# Patient Record
Sex: MALE | Race: WHITE | Employment: FULL TIME | URBAN - METROPOLITAN AREA
[De-identification: names, ages, dates, MRNs, and addresses within clinical notes are randomized per-mention and may not be internally consistent; named-entity substitution may affect disease eponyms.]

---

## 2017-03-29 ENCOUNTER — TELEPHONE (OUTPATIENT)
Dept: INTERNAL MEDICINE CLINIC | Facility: CLINIC | Age: 37
End: 2017-03-29

## 2017-03-29 DIAGNOSIS — Z20.2 POSSIBLE EXPOSURE TO STD: Primary | ICD-10-CM

## 2017-03-29 NOTE — TELEPHONE ENCOUNTER
Patient asking if you could order STD testing he may go for, and follow up with Jayne Rojo when he returns next week. Had unprotected sex.

## 2017-03-30 NOTE — TELEPHONE ENCOUNTER
Message has been left on pt's voicemail letting him know that the lab tests have been ordered and he can go to one of [facility] labs and get it done.

## 2017-03-31 ENCOUNTER — LAB ENCOUNTER (OUTPATIENT)
Dept: LAB | Facility: HOSPITAL | Age: 37
End: 2017-03-31
Attending: INTERNAL MEDICINE
Payer: COMMERCIAL

## 2017-03-31 DIAGNOSIS — Z20.2 POSSIBLE EXPOSURE TO STD: ICD-10-CM

## 2017-03-31 PROCEDURE — 87591 N.GONORRHOEAE DNA AMP PROB: CPT

## 2017-03-31 PROCEDURE — 36415 COLL VENOUS BLD VENIPUNCTURE: CPT

## 2017-03-31 PROCEDURE — 87340 HEPATITIS B SURFACE AG IA: CPT

## 2017-03-31 PROCEDURE — 87491 CHLMYD TRACH DNA AMP PROBE: CPT

## 2017-03-31 PROCEDURE — 86780 TREPONEMA PALLIDUM: CPT

## 2017-03-31 PROCEDURE — 87389 HIV-1 AG W/HIV-1&-2 AB AG IA: CPT

## 2017-03-31 PROCEDURE — 86803 HEPATITIS C AB TEST: CPT

## 2017-03-31 PROCEDURE — 86694 HERPES SIMPLEX NES ANTBDY: CPT

## 2017-03-31 PROCEDURE — 86704 HEP B CORE ANTIBODY TOTAL: CPT

## 2017-03-31 PROCEDURE — 80500 HEPATITIS A B + C PROFILE: CPT

## 2017-03-31 PROCEDURE — 86708 HEPATITIS A ANTIBODY: CPT

## 2017-03-31 PROCEDURE — 86709 HEPATITIS A IGM ANTIBODY: CPT

## 2017-03-31 PROCEDURE — 86706 HEP B SURFACE ANTIBODY: CPT

## 2017-06-15 ENCOUNTER — PATIENT MESSAGE (OUTPATIENT)
Dept: INTERNAL MEDICINE CLINIC | Facility: CLINIC | Age: 37
End: 2017-06-15

## 2017-06-15 NOTE — TELEPHONE ENCOUNTER
Spoke with pt, wanted to schedule a follow up visit for a general check up. Last seen 12/13/16. Denies acute issues. Offered appt at 5:00 pm today, pt is unable to accept appt. Will be traveling for the next week and will call back upon his return.   Gita Nguyen

## 2017-06-15 NOTE — TELEPHONE ENCOUNTER
From: Omar Killian  To: Marvell Soulier, MD  Sent: 6/15/2017 11:39 AM CDT  Subject: Other    Dear Dr Usha Lyn,    I wanted to see if you had any appointments this afternoon around 3.30-4.30pm?    Kind regards,    Ishmael Casper

## 2017-06-26 ENCOUNTER — OFFICE VISIT (OUTPATIENT)
Dept: INTERNAL MEDICINE CLINIC | Facility: CLINIC | Age: 37
End: 2017-06-26

## 2017-06-26 ENCOUNTER — TELEPHONE (OUTPATIENT)
Dept: INTERNAL MEDICINE CLINIC | Facility: CLINIC | Age: 37
End: 2017-06-26

## 2017-06-26 VITALS
HEIGHT: 73 IN | HEART RATE: 66 BPM | BODY MASS INDEX: 29.55 KG/M2 | SYSTOLIC BLOOD PRESSURE: 133 MMHG | TEMPERATURE: 98 F | DIASTOLIC BLOOD PRESSURE: 87 MMHG | WEIGHT: 223 LBS

## 2017-06-26 DIAGNOSIS — F32.A DEPRESSION, UNSPECIFIED DEPRESSION TYPE: ICD-10-CM

## 2017-06-26 DIAGNOSIS — Z20.2 POSSIBLE EXPOSURE TO STD: Primary | ICD-10-CM

## 2017-06-26 PROCEDURE — 99214 OFFICE O/P EST MOD 30 MIN: CPT | Performed by: INTERNAL MEDICINE

## 2017-06-26 PROCEDURE — 99212 OFFICE O/P EST SF 10 MIN: CPT | Performed by: INTERNAL MEDICINE

## 2017-06-26 PROCEDURE — 36415 COLL VENOUS BLD VENIPUNCTURE: CPT | Performed by: INTERNAL MEDICINE

## 2017-06-26 RX ORDER — PAROXETINE HYDROCHLORIDE 20 MG/1
20 TABLET, FILM COATED ORAL DAILY
Qty: 30 TABLET | Refills: 2 | Status: SHIPPED | OUTPATIENT
Start: 2017-06-26 | End: 2017-10-24

## 2017-06-26 NOTE — TELEPHONE ENCOUNTER
Patient called asking if he could have orders placed for full STD panel. Per patient had unprotected sex and wants to be checked. He will schedule an appointment after labs done since he has more questions.

## 2017-06-26 NOTE — TELEPHONE ENCOUNTER
Per pcp, patient needs to come in and discuss tests requested.  Patient aware, appointment scheduled for today 6/26/17 at 6:45 pm.

## 2017-06-27 NOTE — PROGRESS NOTES
HPI:    Patient ID: Meenu Salazar is a 31year old male. HPI  Patient comes in today with complaint of feeling depressed lately last few months he said has been feeling down low energy feeling like not enjoying things he used to enjoy.   Patient is ma PHYSICAL EXAM:    Physical Exam   Constitutional: He is oriented to person, place, and time. He appears well-developed and well-nourished. HENT:   Head: Normocephalic and atraumatic.    Eyes: Conjunctivae and EOM are normal. Pupils are equal, round, a

## 2017-06-27 NOTE — PATIENT INSTRUCTIONS
ASSESSMENT/PLAN:   Possible exposure to std  (primary encounter diagnosis)- will test, need to use protection at all times  Depression, unspecified depression type- no suicidal ideation, will start on Paroxetine, take the medication as prescribed, if sympt

## 2017-07-17 ENCOUNTER — OFFICE VISIT (OUTPATIENT)
Dept: SURGERY | Facility: CLINIC | Age: 37
End: 2017-07-17

## 2017-07-17 VITALS
BODY MASS INDEX: 29.16 KG/M2 | SYSTOLIC BLOOD PRESSURE: 118 MMHG | HEIGHT: 73 IN | WEIGHT: 220 LBS | DIASTOLIC BLOOD PRESSURE: 82 MMHG | TEMPERATURE: 99 F | HEART RATE: 65 BPM | RESPIRATION RATE: 16 BRPM

## 2017-07-17 DIAGNOSIS — Z30.09 VASECTOMY EVALUATION: Primary | ICD-10-CM

## 2017-07-17 DIAGNOSIS — R35.1 NOCTURIA: ICD-10-CM

## 2017-07-17 DIAGNOSIS — R10.2 PERINEAL PAIN IN MALE: ICD-10-CM

## 2017-07-17 PROBLEM — G89.29 CHRONIC MALE PELVIC PAIN: Status: ACTIVE | Noted: 2017-07-17

## 2017-07-17 PROCEDURE — 99244 OFF/OP CNSLTJ NEW/EST MOD 40: CPT | Performed by: UROLOGY

## 2017-07-17 PROCEDURE — 99212 OFFICE O/P EST SF 10 MIN: CPT | Performed by: UROLOGY

## 2017-07-17 RX ORDER — DIAZEPAM 10 MG/1
TABLET ORAL
Qty: 1 TABLET | Refills: 0 | Status: SHIPPED | OUTPATIENT
Start: 2017-07-17

## 2017-07-17 RX ORDER — HYDROCODONE BITARTRATE AND ACETAMINOPHEN 5; 325 MG/1; MG/1
TABLET ORAL
Qty: 20 TABLET | Refills: 0 | Status: SHIPPED | OUTPATIENT
Start: 2017-07-17

## 2017-07-17 RX ORDER — GABAPENTIN 100 MG/1
CAPSULE ORAL
COMMUNITY
Start: 2017-02-28 | End: 2017-07-17

## 2017-07-17 NOTE — PROGRESS NOTES
Vincenzo Thomason is a 31year old male. HPI:   Patient presents with:  Vasectomy      History provided by PT. Reffered by Dr. Lacy Camilo. 1. [facility]                 Patient is 31years old  and his wife is  36years old. that he was prescribed an antibiotic for a prostate infection by a non [facility] physician. about a year ago.       Review of previous records:   6/26/2017: Office Visit: Dr. Jacob Curtis depressed lately last few months, marriage strained lately; Dep difficulty postponing urination, urinary frequency less then 2 hours, intermittent stream, weak stream, and nocturia 1x.   Gastrointestinal:  Negative for abdominal pain, constipation, decreased appetite, diarrhea and vomiting;      Neurological:  Negative EPIDIDYMIS  normal     LENGTH OF VAS DEFERENS  Right vas deferens located separate and lateral to spermatochord; average to slightly longer  DIAMETER OF VAS DEFERENS  average  ADIPOSE TISSUE UPPER SCROTUM none  URETHRAL MEATUS normal  PENIS circumcised could make his wife pregnant and has to bring in two separate semen specimens six weeks apart before we confirm that there are no longer any motile sperm in his distal reproductive tract.   I explained to him and he understands, that there is a possibility can occur lasting for years.   I have explained to patient  possible complications including possible bleeding, clot, infection,  nerve, anesthesia complications, persistent post-operative pain, possible injury to organs, possible failure of surgery or of s Prostate cancer screening/PSA screening  --   I strongly recommend to patient that he start annual prostate cancer PSA screening at age 56, unless if there is a family history of prostate cancer at a younger age, upon which recommendation might be to start procedure    7. Please do not drink any caffeinated beverages on the morning of the procedure; you may have them after the procedure    8.   Please eat breakfast or lunch before the procedure because we will not be putting you to sleep, because otherwise t urinate. 4. Please significantly restrict fluids for 3 hours before bedtime; drink as little liquids as possible to try to improve waking up at night to urinate.     5. Please limit alcohol within 4 hours of bedtime as it can contribute to waking up at Bobbi Schuler MD, 7/17/2017, 3:37 PM

## 2017-07-17 NOTE — PATIENT INSTRUCTIONS
1.  Please continue  birth control precautions without exception, every time you have intercourse,  until further notice    2. Proceed with plans for voluntary sterilization -- bilateral vasectomy     3.   NO aspirin or NSAIDs (medications such as Advil, M hydrocodone 30 minutes before the actual start time of the procedure and not necessarily when you arrive at the surgery center.           13.  Please hold omega-3 pills, garlic pills, glucosamine–chondroitin for 7 days before procedure                  If w

## 2017-10-24 ENCOUNTER — MED REC SCAN ONLY (OUTPATIENT)
Dept: INTERNAL MEDICINE CLINIC | Facility: CLINIC | Age: 37
End: 2017-10-24

## 2017-10-24 ENCOUNTER — OFFICE VISIT (OUTPATIENT)
Dept: INTERNAL MEDICINE CLINIC | Facility: CLINIC | Age: 37
End: 2017-10-24

## 2017-10-24 VITALS
RESPIRATION RATE: 18 BRPM | DIASTOLIC BLOOD PRESSURE: 84 MMHG | TEMPERATURE: 98 F | SYSTOLIC BLOOD PRESSURE: 125 MMHG | WEIGHT: 218 LBS | BODY MASS INDEX: 28.89 KG/M2 | HEART RATE: 62 BPM | HEIGHT: 73 IN

## 2017-10-24 DIAGNOSIS — Z00.00 ANNUAL PHYSICAL EXAM: Primary | ICD-10-CM

## 2017-10-24 DIAGNOSIS — Z11.3 SCREEN FOR STD (SEXUALLY TRANSMITTED DISEASE): ICD-10-CM

## 2017-10-24 DIAGNOSIS — H53.9 VISION CHANGES: ICD-10-CM

## 2017-10-24 DIAGNOSIS — D49.0 PANCREATIC NEOPLASM: ICD-10-CM

## 2017-10-24 PROCEDURE — 90686 IIV4 VACC NO PRSV 0.5 ML IM: CPT | Performed by: INTERNAL MEDICINE

## 2017-10-24 PROCEDURE — 99395 PREV VISIT EST AGE 18-39: CPT | Performed by: INTERNAL MEDICINE

## 2017-10-24 PROCEDURE — 36415 COLL VENOUS BLD VENIPUNCTURE: CPT | Performed by: INTERNAL MEDICINE

## 2017-10-24 PROCEDURE — 90471 IMMUNIZATION ADMIN: CPT | Performed by: INTERNAL MEDICINE

## 2017-10-24 NOTE — PROGRESS NOTES
HPI:    Patient ID: Beka Saldana is a 30year old male.     HPI  She comes in for annual checkup, overall he is doing okay like to be checked for STDs denies any other complaints last time I saw him he was complaining of depression took the medication f pertinent surgical history. No family history on file.    Social History: Smoking status: Never Smoker                                                              Alcohol use: Yes           3.0 oz/week     Cans of beer: 6 per week       PHYSICAL EXAM: Antigen      HIV AG AB Combo      T PALLIDUM SCREENING CASCADE      Flulaval 0.5 ml 6 mon and older Quad single dose PF (13951)      Chlamydia/Gc Amplification    Meds This Visit:  No prescriptions requested or ordered in this encounter    Imaging & Referr

## 2017-10-24 NOTE — PATIENT INSTRUCTIONS
ASSESSMENT/PLAN:   Annual physical exam  (primary encounter diagnosis) exam is okay we will add some labs will call you with results  Screen for std (sexually transmitted disease) will test for STDs which no results  Pancreatic neoplasm doing well has foll

## 2017-12-10 ENCOUNTER — PATIENT MESSAGE (OUTPATIENT)
Dept: INTERNAL MEDICINE CLINIC | Facility: CLINIC | Age: 37
End: 2017-12-10

## 2017-12-10 DIAGNOSIS — Z71.1 CONCERN ABOUT STD IN MALE WITHOUT DIAGNOSIS: Primary | ICD-10-CM

## 2017-12-11 ENCOUNTER — APPOINTMENT (OUTPATIENT)
Dept: LAB | Facility: HOSPITAL | Age: 37
End: 2017-12-11
Attending: INTERNAL MEDICINE
Payer: COMMERCIAL

## 2017-12-11 ENCOUNTER — TELEPHONE (OUTPATIENT)
Dept: INTERNAL MEDICINE CLINIC | Facility: CLINIC | Age: 37
End: 2017-12-11

## 2017-12-11 DIAGNOSIS — Z71.1 CONCERN ABOUT STD IN MALE WITHOUT DIAGNOSIS: ICD-10-CM

## 2017-12-11 DIAGNOSIS — R30.0 DYSURIA: Primary | ICD-10-CM

## 2017-12-11 PROCEDURE — 86780 TREPONEMA PALLIDUM: CPT

## 2017-12-11 PROCEDURE — 87389 HIV-1 AG W/HIV-1&-2 AB AG IA: CPT

## 2017-12-11 PROCEDURE — 87340 HEPATITIS B SURFACE AG IA: CPT

## 2017-12-11 PROCEDURE — 81001 URINALYSIS AUTO W/SCOPE: CPT | Performed by: INTERNAL MEDICINE

## 2017-12-11 PROCEDURE — 87086 URINE CULTURE/COLONY COUNT: CPT | Performed by: INTERNAL MEDICINE

## 2017-12-11 PROCEDURE — 87491 CHLMYD TRACH DNA AMP PROBE: CPT | Performed by: INTERNAL MEDICINE

## 2017-12-11 PROCEDURE — 36415 COLL VENOUS BLD VENIPUNCTURE: CPT

## 2017-12-11 PROCEDURE — 86803 HEPATITIS C AB TEST: CPT

## 2017-12-11 PROCEDURE — 87591 N.GONORRHOEAE DNA AMP PROB: CPT | Performed by: INTERNAL MEDICINE

## 2017-12-11 RX ORDER — CIPROFLOXACIN 250 MG/1
250 TABLET, FILM COATED ORAL 2 TIMES DAILY
Qty: 14 TABLET | Refills: 0 | Status: SHIPPED | OUTPATIENT
Start: 2017-12-11 | End: 2017-12-18

## 2017-12-11 NOTE — TELEPHONE ENCOUNTER
From: Shereen Maynard  To: Rosa Collado MD  Sent: 12/10/2017 9:35 PM CST  Subject: Visit Follow-up Question    Dear Dr Ryan Cote,    I would appreciate if you could give me a call as I have a follow up request from my last vist to your office.     Kind rega

## 2017-12-11 NOTE — TELEPHONE ENCOUNTER
Pt stated when urine still having burning senation,   Will like to do another urine sample. Asked to place order, will like to stop by hospital, at  [address], [address] for Health to have the procedure done.

## 2017-12-12 ENCOUNTER — TELEPHONE (OUTPATIENT)
Dept: INTERNAL MEDICINE CLINIC | Facility: CLINIC | Age: 37
End: 2017-12-12

## 2017-12-12 NOTE — TELEPHONE ENCOUNTER
Patient aware they could use urine collected yesterday for gc/chlamydia. Patient informed to wait for call back for test results. Patient verbalized understanding, denies further questions.

## 2017-12-12 NOTE — TELEPHONE ENCOUNTER
Pt is concerned about test results, pt was informed that not all results were back yet but he still wants to know results.

## 2017-12-13 NOTE — TELEPHONE ENCOUNTER
Spoke with patient, aware chlamydia/gono still pending but urine culture needs to be reviewed by pcp. Dr. Riana Diehl, urine culture states <10,000 CFU/ML Gram negative anatoliy.  Do you want him to continue taking cipro until STIs come in?

## 2017-12-13 NOTE — TELEPHONE ENCOUNTER
Patient informed to continue taking antibiotic. Issac Anthony still pending. Will call patient once results are in. Patient verbalized understanding.

## 2018-01-09 ENCOUNTER — TELEPHONE (OUTPATIENT)
Dept: INTERNAL MEDICINE CLINIC | Facility: CLINIC | Age: 38
End: 2018-01-09

## 2018-01-09 DIAGNOSIS — R10.13 EPIGASTRIC PAIN: Primary | ICD-10-CM

## 2018-01-09 DIAGNOSIS — Z87.19 HISTORY OF PANCREATITIS: ICD-10-CM

## 2018-01-09 NOTE — TELEPHONE ENCOUNTER
Patient called had episode of left side upper abdominal pain radiating to back. Concerned as it feels like similar pain when he had pancreatitis before his Whipple surgery. Patient in [facility]. Denies Nausea, vomiting or diarrhea. Had a little chills.   St

## 2018-01-14 ENCOUNTER — APPOINTMENT (OUTPATIENT)
Dept: LAB | Facility: HOSPITAL | Age: 38
End: 2018-01-14
Attending: INTERNAL MEDICINE
Payer: COMMERCIAL

## 2018-01-14 DIAGNOSIS — Z87.19 HISTORY OF PANCREATITIS: ICD-10-CM

## 2018-01-14 DIAGNOSIS — R10.13 EPIGASTRIC PAIN: ICD-10-CM

## 2018-01-14 LAB
ALBUMIN SERPL BCP-MCNC: 4.4 G/DL (ref 3.5–4.8)
ALBUMIN/GLOB SERPL: 1.8 {RATIO} (ref 1–2)
ALP SERPL-CCNC: 64 U/L (ref 32–100)
ALT SERPL-CCNC: 43 U/L (ref 17–63)
ANION GAP SERPL CALC-SCNC: 8 MMOL/L (ref 0–18)
AST SERPL-CCNC: 31 U/L (ref 15–41)
BILIRUB SERPL-MCNC: 2.6 MG/DL (ref 0.3–1.2)
BILIRUB UR QL: NEGATIVE
BUN SERPL-MCNC: 9 MG/DL (ref 8–20)
BUN/CREAT SERPL: 9.2 (ref 10–20)
CALCIUM SERPL-MCNC: 9.2 MG/DL (ref 8.5–10.5)
CHLORIDE SERPL-SCNC: 104 MMOL/L (ref 95–110)
CHOLEST SERPL-MCNC: 142 MG/DL (ref 110–200)
CLARITY UR: CLEAR
CO2 SERPL-SCNC: 27 MMOL/L (ref 22–32)
COLOR UR: YELLOW
CREAT SERPL-MCNC: 0.98 MG/DL (ref 0.5–1.5)
GLOBULIN PLAS-MCNC: 2.4 G/DL (ref 2.5–3.7)
GLUCOSE SERPL-MCNC: 96 MG/DL (ref 70–99)
GLUCOSE UR-MCNC: NEGATIVE MG/DL
HDLC SERPL-MCNC: 31 MG/DL
HGB UR QL STRIP.AUTO: NEGATIVE
KETONES UR-MCNC: NEGATIVE MG/DL
LDLC SERPL CALC-MCNC: 92 MG/DL (ref 0–99)
LEUKOCYTE ESTERASE UR QL STRIP.AUTO: NEGATIVE
LIPASE SERPL-CCNC: 23 U/L (ref 22–51)
NITRITE UR QL STRIP.AUTO: NEGATIVE
NONHDLC SERPL-MCNC: 111 MG/DL
OSMOLALITY UR CALC.SUM OF ELEC: 287 MOSM/KG (ref 275–295)
PH UR: 6 [PH] (ref 5–8)
POTASSIUM SERPL-SCNC: 3.1 MMOL/L (ref 3.3–5.1)
PROT SERPL-MCNC: 6.8 G/DL (ref 5.9–8.4)
PROT UR-MCNC: NEGATIVE MG/DL
SODIUM SERPL-SCNC: 139 MMOL/L (ref 136–144)
SP GR UR STRIP: 1.01 (ref 1–1.03)
TRIGL SERPL-MCNC: 93 MG/DL (ref 1–149)
UROBILINOGEN UR STRIP-ACNC: <2
VIT C UR-MCNC: NEGATIVE MG/DL

## 2018-01-14 PROCEDURE — 80053 COMPREHEN METABOLIC PANEL: CPT

## 2018-01-14 PROCEDURE — 36415 COLL VENOUS BLD VENIPUNCTURE: CPT

## 2018-01-14 PROCEDURE — 83690 ASSAY OF LIPASE: CPT

## 2018-01-14 PROCEDURE — 80061 LIPID PANEL: CPT

## 2018-01-14 PROCEDURE — 81003 URINALYSIS AUTO W/O SCOPE: CPT | Performed by: INTERNAL MEDICINE

## 2018-04-27 ENCOUNTER — OFFICE VISIT (OUTPATIENT)
Dept: FAMILY MEDICINE | Facility: CLINIC | Age: 38
End: 2018-04-27
Payer: COMMERCIAL

## 2018-04-27 VITALS
SYSTOLIC BLOOD PRESSURE: 104 MMHG | OXYGEN SATURATION: 99 % | BODY MASS INDEX: 30.68 KG/M2 | DIASTOLIC BLOOD PRESSURE: 76 MMHG | WEIGHT: 226.5 LBS | TEMPERATURE: 98.7 F | RESPIRATION RATE: 16 BRPM | HEART RATE: 78 BPM | HEIGHT: 72 IN

## 2018-04-27 DIAGNOSIS — R30.0 DYSURIA: ICD-10-CM

## 2018-04-27 DIAGNOSIS — N34.2 URETHRITIS: Primary | ICD-10-CM

## 2018-04-27 LAB
ALBUMIN UR-MCNC: NEGATIVE MG/DL
APPEARANCE UR: CLEAR
BILIRUB UR QL STRIP: NEGATIVE
COLOR UR AUTO: YELLOW
GLUCOSE UR STRIP-MCNC: NEGATIVE MG/DL
HGB UR QL STRIP: NEGATIVE
KETONES UR STRIP-MCNC: NEGATIVE MG/DL
LEUKOCYTE ESTERASE UR QL STRIP: NEGATIVE
NITRATE UR QL: NEGATIVE
PH UR STRIP: 6 PH (ref 5–7)
RBC #/AREA URNS AUTO: NORMAL /HPF
SOURCE: NORMAL
SP GR UR STRIP: 1.01 (ref 1–1.03)
UROBILINOGEN UR STRIP-ACNC: 0.2 EU/DL (ref 0.2–1)
WBC #/AREA URNS AUTO: NORMAL /HPF

## 2018-04-27 PROCEDURE — 99203 OFFICE O/P NEW LOW 30 MIN: CPT | Performed by: FAMILY MEDICINE

## 2018-04-27 PROCEDURE — 81001 URINALYSIS AUTO W/SCOPE: CPT | Performed by: FAMILY MEDICINE

## 2018-04-27 RX ORDER — CIPROFLOXACIN 500 MG/1
500 TABLET, FILM COATED ORAL 2 TIMES DAILY
Qty: 20 TABLET | Refills: 0 | Status: SHIPPED | OUTPATIENT
Start: 2018-04-27 | End: 2018-08-31

## 2018-04-27 ASSESSMENT — ANXIETY QUESTIONNAIRES
3. WORRYING TOO MUCH ABOUT DIFFERENT THINGS: SEVERAL DAYS
1. FEELING NERVOUS, ANXIOUS, OR ON EDGE: SEVERAL DAYS
2. NOT BEING ABLE TO STOP OR CONTROL WORRYING: SEVERAL DAYS
7. FEELING AFRAID AS IF SOMETHING AWFUL MIGHT HAPPEN: MORE THAN HALF THE DAYS
6. BECOMING EASILY ANNOYED OR IRRITABLE: MORE THAN HALF THE DAYS
IF YOU CHECKED OFF ANY PROBLEMS ON THIS QUESTIONNAIRE, HOW DIFFICULT HAVE THESE PROBLEMS MADE IT FOR YOU TO DO YOUR WORK, TAKE CARE OF THINGS AT HOME, OR GET ALONG WITH OTHER PEOPLE: SOMEWHAT DIFFICULT
5. BEING SO RESTLESS THAT IT IS HARD TO SIT STILL: MORE THAN HALF THE DAYS
GAD7 TOTAL SCORE: 11

## 2018-04-27 ASSESSMENT — PATIENT HEALTH QUESTIONNAIRE - PHQ9: 5. POOR APPETITE OR OVEREATING: MORE THAN HALF THE DAYS

## 2018-04-27 NOTE — MR AVS SNAPSHOT
"              After Visit Summary   2018    Kathleen Clark    MRN: [id number]           Patient Information     Date Of Birth          1980        Visit Information        Provider Department      2018 2:30 PM Chencho Morales MD [facility]        Today's Diagnoses     Urethritis    -  1    Dysuria          Care Instructions    Push fluids          Follow-ups after your visit        Who to contact     If you have questions or need follow up information about today's clinic visit or your schedule please contact [facility] directly at [phone].  Normal or non-critical lab and imaging results will be communicated to you by MyChart, letter or phone within 4 business days after the clinic has received the results. If you do not hear from us within 7 days, please contact the clinic through [employer] or phone. If you have a critical or abnormal lab result, we will notify you by phone as soon as possible.  Submit refill requests through [employer] or call your pharmacy and they will forward the refill request to us. Please allow 3 business days for your refill to be completed.          Additional Information About Your Visit        MyChart Information     [employer] lets you send messages to your doctor, view your test results, renew your prescriptions, schedule appointments and more. To sign up, go to [email or web address] . Click on \"Log in\" on the left side of the screen, which will take you to the Welcome page. Then click on \"Sign up Now\" on the right side of the page.     You will be asked to enter the access code listed below, as well as some personal information. Please follow the directions to create your username and password.     Your access code is: [email or web address]  Expires: 2018  2:22 PM     Your access code will  in 90 days. If you need help or a new code, please call your [facility] or [phone].        Care EveryWhere " ID     This is your Care EveryWhere ID. This could be used by other organizations to access your [facility] medical records  [id number]        Your Vitals Were     Pulse Temperature Respirations Height Pulse Oximetry BMI (Body Mass Index)    78 98.7  F (37.1  C) (Tympanic) 16 6' (1.829 m) 99% 30.72 kg/m2       Blood Pressure from Last 3 Encounters:   04/27/18 104/76    Weight from Last 3 Encounters:   04/27/18 226 lb 8 oz (102.7 kg)              We Performed the Following     UA with Microscopic reflex to Culture          Today's Medication Changes          These changes are accurate as of 4/27/18  3:20 PM.  If you have any questions, ask your nurse or doctor.               Start taking these medicines.        Dose/Directions    ciprofloxacin 500 MG tablet   Commonly known as:  CIPRO   Used for:  Urethritis   Started by:  Chencho Morales MD        Dose:  500 mg   Take 1 tablet (500 mg) by mouth 2 times daily   Quantity:  20 tablet   Refills:  0            Where to get your medicines      Some of these will need a paper prescription and others can be bought over the counter.  Ask your nurse if you have questions.     Bring a paper prescription for each of these medications     ciprofloxacin 500 MG tablet                Primary Care Provider Fax #    Physician No Ref-Primary [phone]       No address on file        Equal Access to Services     GAIL PALMER AH: Genaro pabono Soorlando, waaxda luqadaha, qaybta kaalmada adeegyada, rick wynn. So [facility] [phone].    ATENCIÓN: Si habla español, tiene a cruz disposición servicios gratuitos de asistencia lingüística. Llame al [phone].    We comply with applicable federal civil rights laws and [place] laws. We do not discriminate on the basis of race, color, national origin, age, disability, sex, sexual orientation, or gender identity.            Thank you!     Thank you for choosing [facility]  for  your care. Our goal is always to provide you with excellent care. Hearing back from our patients is one way we can continue to improve our services. Please take a few minutes to complete the written survey that you may receive in the mail after your visit with us. Thank you!             Your Updated Medication List - Protect others around you: Learn how to safely use, store and throw away your medicines at www.disposemymeds.org.          This list is accurate as of 4/27/18  3:20 PM.  Always use your most recent med list.                   Brand Name Dispense Instructions for use Diagnosis    ciprofloxacin 500 MG tablet    CIPRO    20 tablet    Take 1 tablet (500 mg) by mouth 2 times daily    Urethritis       FISH OIL PO

## 2018-04-27 NOTE — PROGRESS NOTES
SUBJECTIVE:   Kathleen Clark is a 37 year old male who presents to clinic today for the following health issues:    Emigrated from [place] 7 yrs ago    Recently moved from [place] to [place].     Genitourinary - Male  Onset: 1-2 weeks    Description:   Dysuria (painful urination): YES  Hematuria (blood in urine): no   Frequency: YES  Are you urinating at night : YES  Hesitancy (delay in urine): no   Retention (unable to empty): no   Decrease in urinary flow: no   Incontinence: no     Progression of Symptoms:  worsening    Accompanying Signs & Symptoms:  Fever: no   Back/Flank pain: no   Urethral discharge: no   Testicle lumps/masses/pain: no   Nausea and/or vomiting: no   Abdominal pain: no     History:   History of frequent UTI's: no. Had one in 12/2017  History of kidney stones: no   History of hernias: YES  Personal or Family history of Prostate problems: YES- PGF  Sexually active: YES    Precipitating factors:   Worse when drinking caffeneited drinks    Alleviating factors:  None    Hx of previous UTI in Dec 2017.  Pt was seen by urologist in [place]       Problem list and histories reviewed & adjusted, as indicated.  Additional history: as documented    Labs reviewed in EPIC    Reviewed and updated as needed this visit by clinical staff       Reviewed and updated as needed this visit by Provider         ROS:  CONSTITUTIONAL:NEGATIVE for fever, chills, change in weight  : positive for, dysuria, frequency and nocturia x 1    OBJECTIVE:                                                    /76 (BP Location: Right arm, Patient Position: Sitting, Cuff Size: Adult Large)  Pulse 78  Temp 98.7  F (37.1  C) (Tympanic)  Resp 16  Ht 6' (1.829 m)  Wt 226 lb 8 oz (102.7 kg)  SpO2 99%  BMI 30.72 kg/m2  Body mass index is 30.72 kg/(m^2).  GENERAL APPEARANCE: healthy, alert and no distress  ABDOMEN: soft, nontender, without hepatosplenomegaly or masses, bowel sounds normal and   RECTAL: prostate normal, non  tender   (male): testicles normal without atrophy or masses, no hernias and penis normal without urethral discharge    Diagnostic test results:  Urinalysis - unremarkable     ASSESSMENT/PLAN:                                                        ICD-10-CM    1. Urethritis N34.2 ciprofloxacin (CIPRO) 500 MG tablet   2. Dysuria R30.0 UA with Microscopic reflex to Culture       Follow up with Provider - as needed if not improving   There are no Patient Instructions on file for this visit.    Chencho Morales MD  [facility]

## 2018-04-27 NOTE — NURSING NOTE
Chief Complaint   Patient presents with     Urinary Pain     1-2 weeks       Initial /76 (BP Location: Right arm, Patient Position: Sitting, Cuff Size: Adult Large)  Pulse 78  Temp 98.7  F (37.1  C) (Tympanic)  Resp 16  Ht 6' (1.829 m)  Wt 226 lb 8 oz (102.7 kg)  SpO2 99%  BMI 30.72 kg/m2 Estimated body mass index is 30.72 kg/(m^2) as calculated from the following:    Height as of this encounter: 6' (1.829 m).    Weight as of this encounter: 226 lb 8 oz (102.7 kg).  Medication Reconciliation: complete     Princess YANICK Meyer, CMA

## 2018-04-28 ASSESSMENT — PATIENT HEALTH QUESTIONNAIRE - PHQ9: SUM OF ALL RESPONSES TO PHQ QUESTIONS 1-9: 11

## 2018-04-28 ASSESSMENT — ANXIETY QUESTIONNAIRES: GAD7 TOTAL SCORE: 11

## 2018-08-30 ENCOUNTER — TELEPHONE (OUTPATIENT)
Dept: FAMILY MEDICINE | Facility: CLINIC | Age: 38
End: 2018-08-30

## 2018-08-30 NOTE — TELEPHONE ENCOUNTER
Patient called reporting abdominal pain  ABDOMINAL PAIN     Onset: Sunday    Description:   Character: not in pain right now  Location:below rib cage region  Radiation: None    Intensity: states not in pain, states it is like a pulsing, started taking nexium    Progression of Symptoms:  improving    Accompanying Signs & Symptoms:  Fever/Chills?: no   Gas/Bloating: no   Nausea: no   Vomitting: no   Diarrhea?: no   Constipation:no   Dysuria or Hematuria: no    History:   Trauma: no   Previous similar pain: YES, pancreatitis, had wipple procedure 10/15, removed part of the pancreas, (head of the pancreatitis, had cyst in the main duct of the pancreas, benign)   Previous tests done: surgery    Precipitating factors:   Does the pain change with:     Food: YES, caffeine beverages (coffee)    BM: no     Urination: no     Alleviating factors:  Nexium    Therapies Tried and outcome: nexium brought improvement.         LMP:  not applicable        Recent Medication changes: started nexium, pain is gone, but pulsing or discomfort in the area around his rib cage still remains.     Appointment scheduled for early tomorrow morning, patient is leaving for [place] Monday, will be flying in from [facility] and wants to know if Dr. Morales would like any labs drawn before hand. The patient is worried that with his Hx, this may be pancreatitis again, he wants to make sure.     He would be arriving in [facility] at 7:30 pm and could go [place] (possibly, triage will check on this, if the PCP wants to order labs ahead of time) [facility] so the labs can be resulted and can be gone over with the patient at his appointment tomorrow.     Routing to provider to ask if he wants to order any labs ahead of time.       References used: Telephone Triage Protocols for Nurses fifth edition         Andreia Cano RN  Triage RN  [facility]

## 2018-08-31 ENCOUNTER — TELEPHONE (OUTPATIENT)
Dept: FAMILY MEDICINE | Facility: CLINIC | Age: 38
End: 2018-08-31

## 2018-08-31 ENCOUNTER — OFFICE VISIT (OUTPATIENT)
Dept: FAMILY MEDICINE | Facility: CLINIC | Age: 38
End: 2018-08-31
Payer: COMMERCIAL

## 2018-08-31 VITALS
TEMPERATURE: 97.4 F | HEIGHT: 72 IN | DIASTOLIC BLOOD PRESSURE: 76 MMHG | BODY MASS INDEX: 30 KG/M2 | RESPIRATION RATE: 16 BRPM | OXYGEN SATURATION: 99 % | SYSTOLIC BLOOD PRESSURE: 102 MMHG | WEIGHT: 221.5 LBS | HEART RATE: 55 BPM

## 2018-08-31 DIAGNOSIS — R10.12 ABDOMINAL PAIN, LEFT UPPER QUADRANT: Primary | ICD-10-CM

## 2018-08-31 DIAGNOSIS — K86.2 PANCREATIC CYST: ICD-10-CM

## 2018-08-31 LAB
ALBUMIN SERPL-MCNC: 4.3 G/DL (ref 3.4–5)
ALP SERPL-CCNC: 81 U/L (ref 40–150)
ALT SERPL W P-5'-P-CCNC: 53 U/L (ref 0–70)
AMYLASE SERPL-CCNC: 25 U/L (ref 30–110)
ANION GAP SERPL CALCULATED.3IONS-SCNC: 9 MMOL/L (ref 3–14)
AST SERPL W P-5'-P-CCNC: 26 U/L (ref 0–45)
BILIRUB SERPL-MCNC: 3 MG/DL (ref 0.2–1.3)
BUN SERPL-MCNC: 13 MG/DL (ref 7–30)
CALCIUM SERPL-MCNC: 9.1 MG/DL (ref 8.5–10.1)
CHLORIDE SERPL-SCNC: 103 MMOL/L (ref 94–109)
CO2 SERPL-SCNC: 27 MMOL/L (ref 20–32)
CREAT SERPL-MCNC: 1.06 MG/DL (ref 0.66–1.25)
ERYTHROCYTE [DISTWIDTH] IN BLOOD BY AUTOMATED COUNT: 12.4 % (ref 10–15)
ERYTHROCYTE [SEDIMENTATION RATE] IN BLOOD BY WESTERGREN METHOD: 3 MM/H (ref 0–15)
GFR SERPL CREATININE-BSD FRML MDRD: 79 ML/MIN/1.7M2
GLUCOSE SERPL-MCNC: 98 MG/DL (ref 70–99)
HCT VFR BLD AUTO: 45.8 % (ref 40–53)
HGB BLD-MCNC: 15.5 G/DL (ref 13.3–17.7)
LIPASE SERPL-CCNC: 87 U/L (ref 73–393)
MCH RBC QN AUTO: 31 PG (ref 26.5–33)
MCHC RBC AUTO-ENTMCNC: 33.8 G/DL (ref 31.5–36.5)
MCV RBC AUTO: 92 FL (ref 78–100)
PLATELET # BLD AUTO: 193 10E9/L (ref 150–450)
POTASSIUM SERPL-SCNC: 4.1 MMOL/L (ref 3.4–5.3)
PROT SERPL-MCNC: 7.1 G/DL (ref 6.8–8.8)
RBC # BLD AUTO: 5 10E12/L (ref 4.4–5.9)
SODIUM SERPL-SCNC: 139 MMOL/L (ref 133–144)
WBC # BLD AUTO: 4.5 10E9/L (ref 4–11)

## 2018-08-31 PROCEDURE — 85652 RBC SED RATE AUTOMATED: CPT | Performed by: FAMILY MEDICINE

## 2018-08-31 PROCEDURE — 80053 COMPREHEN METABOLIC PANEL: CPT | Performed by: FAMILY MEDICINE

## 2018-08-31 PROCEDURE — 36415 COLL VENOUS BLD VENIPUNCTURE: CPT | Performed by: FAMILY MEDICINE

## 2018-08-31 PROCEDURE — 83690 ASSAY OF LIPASE: CPT | Performed by: FAMILY MEDICINE

## 2018-08-31 PROCEDURE — 85027 COMPLETE CBC AUTOMATED: CPT | Performed by: FAMILY MEDICINE

## 2018-08-31 PROCEDURE — 82150 ASSAY OF AMYLASE: CPT | Performed by: FAMILY MEDICINE

## 2018-08-31 PROCEDURE — 99214 OFFICE O/P EST MOD 30 MIN: CPT | Performed by: FAMILY MEDICINE

## 2018-08-31 NOTE — NURSING NOTE
/76 (BP Location: Left arm, Patient Position: Sitting, Cuff Size: Adult Large)  Pulse 55  Temp 97.4  F (36.3  C) (Tympanic)  Resp 16  Ht 6' (1.829 m)  Wt 221 lb 8 oz (100.5 kg)  SpO2 99%  BMI 30.04 kg/m2

## 2018-08-31 NOTE — TELEPHONE ENCOUNTER
Clinic Action Needed:  Yes, please call pt or send him a message on [employer].     Presenting Problem: Pt calling to see if his lab results are back.  Please call him with the results and instructions from the provider.  He is leaving to go to [place] on Sunday.     Routed to: GANGA Laird RN/FNA

## 2018-08-31 NOTE — PROGRESS NOTES
SUBJECTIVE:   Kathleen Clark is a 37 year old male who presents to clinic today for the following health issues:      GERD/Heartburn  Onset: 5 days    Description:     Burning in chest: YES    Intensity: moderate    Progression of Symptoms: same and intermittent    Accompanying Signs & Symptoms:  Does it feel like food gets stuck: no   Nausea: YES  Vomiting (bloody?): no   Abdominal Pain: YES  Black-Tarry stools: no :  Bloody stools: no     History:   Previous ulcers: no     Precipitating factors:   Caffeine use: YES  Alcohol use: YES  NSAID/Aspirin use: no   Tobacco use: no   Worse with fatty foods and alcohol.    Alleviating factors:  Nexium    Therapies Tried and outcome:Nexium, started taking medication on Tuesday  He reports that he is feeling better, almost no symptoms now    Hx of pancreatitis, pancreatic cyst in 2015.  He underwent surgery, had a Whipple procedure done removing the pancreatic cyst and a portion of the head of the pancreas    He has been travelling, was in [facility].  He knows that he had eating a larger very heavy meal and had more alcohol than usual when symptoms started  Since then he has been eating very small amounts, no alcohol, avoiding coffee     He leaves again on Monday, travelling to [place] for 10 days        Problem list and histories reviewed & adjusted, as indicated.  Additional history: as documented    Labs reviewed in EPIC    Reviewed and updated as needed this visit by clinical staff  Tobacco  Allergies  Meds  Med Hx  Surg Hx  Fam Hx  Soc Hx      Reviewed and updated as needed this visit by Provider         ROS:  CONSTITUTIONAL:NEGATIVE for fever, chills, change in weight  RESP:NEGATIVE for significant cough or SOB  CV: NEGATIVE for chest pain, palpitations or peripheral edema  GI: POSITIVE for abdominal pain epigastric and LUQ and NEGATIVE for constipation, diarrhea, nausea, vomiting and weight loss  : negative for dysuria, hematuria, decreased urinary  stream, erectile dysfunction    OBJECTIVE:                                                    /76 (BP Location: Left arm, Patient Position: Sitting, Cuff Size: Adult Large)  Pulse 55  Temp 97.4  F (36.3  C) (Tympanic)  Resp 16  Ht 6' (1.829 m)  Wt 221 lb 8 oz (100.5 kg)  SpO2 99%  BMI 30.04 kg/m2  Body mass index is 30.04 kg/(m^2).  GENERAL APPEARANCE: healthy, alert and no distress  NECK: no adenopathy, no asymmetry, masses, or scars, thyroid normal to palpation and no bruits  RESP: lungs clear to auscultation - no rales, rhonchi or wheezes  CV: regular rates and rhythm, normal S1 S2, no S3 or S4 and no murmur, click or rub  ABDOMEN: soft, nontender, without hepatosplenomegaly or masses and bowel sounds normal    Diagnostic test results:  Lab: see below,results pending     ASSESSMENT/PLAN:                                                        ICD-10-CM    1. Abdominal pain, left upper quadrant R10.12 Comprehensive metabolic panel (BMP + Alb, Alk Phos, ALT, AST, Total. Bili, TP)     Amylase     Lipase     CBC with platelets     ESR: Erythrocyte sedimentation rate   2. Pancreatic cyst K86.2        Follow up with Provider - as needed if not continuing to improve  Continue taking the Nexium    He will get signed up for MyChart access  Patient Instructions   Avoid alcohol.  Keep diet simple, avoid spicy or foods that irritate stomach      Chencho Morales MD  [facility]

## 2018-08-31 NOTE — MR AVS SNAPSHOT
"              After Visit Summary   2018    Kathleen Clark    MRN: [id number]           Patient Information     Date Of Birth          1980        Visit Information        Provider Department      2018 7:45 AM Chencho Morales MD [facility]        Today's Diagnoses     Abdominal pain, left upper quadrant    -  1      Care Instructions    Avoid alcohol.  Keep diet simple, avoid spicy or foods that irritate stomach          Follow-ups after your visit        Who to contact     If you have questions or need follow up information about today's clinic visit or your schedule please contact [facility] directly at [phone].  Normal or non-critical lab and imaging results will be communicated to you by MyChart, letter or phone within 4 business days after the clinic has received the results. If you do not hear from us within 7 days, please contact the clinic through MyChart or phone. If you have a critical or abnormal lab result, we will notify you by phone as soon as possible.  Submit refill requests through Dinglepharb or call your pharmacy and they will forward the refill request to us. Please allow 3 business days for your refill to be completed.          Additional Information About Your Visit        MyChart Information     Dinglepharb lets you send messages to your doctor, view your test results, renew your prescriptions, schedule appointments and more. To sign up, go to [email or web address] . Click on \"Log in\" on the left side of the screen, which will take you to the Welcome page. Then click on \"Sign up Now\" on the right side of the page.     You will be asked to enter the access code listed below, as well as some personal information. Please follow the directions to create your username and password.     Your access code is: [email or web address]  Expires: 2018  7:34 AM     Your access code will  in 90 days. If you need help or a new " code, please call your [place] clinic or [phone].        Care EveryWhere ID     This is your Care EveryWhere ID. This could be used by other organizations to access your [place] medical records  [id number]        Your Vitals Were     Pulse Temperature Respirations Height Pulse Oximetry BMI (Body Mass Index)    55 97.4  F (36.3  C) (Tympanic) 16 6' (1.829 m) 99% 30.04 kg/m2       Blood Pressure from Last 3 Encounters:   08/31/18 102/76   04/27/18 104/76    Weight from Last 3 Encounters:   08/31/18 221 lb 8 oz (100.5 kg)   04/27/18 226 lb 8 oz (102.7 kg)              We Performed the Following     Amylase     CBC with platelets     Comprehensive metabolic panel (BMP + Alb, Alk Phos, ALT, AST, Total. Bili, TP)     ESR: Erythrocyte sedimentation rate     Lipase        Primary Care Provider Office Phone # Fax #    Chencho Morales [phone]       [address] [facility] [address]        Equal Access to Services     [facility]: Hadii aad ku hadasho Soomaali, waaxda luqadaha, qaybta kaalmada adeegyada, waxay jeremías haynilesh schafer . So [facility] [phone].    ATENCIÓN: Si habla español, tiene a cruz disposición servicios gratuitos de asistencia lingüística. Llame al [phone].    We comply with applicable federal civil rights laws and [place] laws. We do not discriminate on the basis of race, color, national origin, age, disability, sex, sexual orientation, or gender identity.            Thank you!     [facility]. Our goal is always to provide you with excellent care. Hearing back from our patients is one way we can continue to improve our services. Please take a few minutes to complete the written survey that you may receive in the mail after your visit with us. Thank you!             Your Updated Medication List - Protect others around you: Learn how to safely use, store and throw away your medicines at  www.disposemymeds.org.          This list is accurate as of 8/31/18  7:59 AM.  Always use your most recent med list.                   Brand Name Dispense Instructions for use Diagnosis    FISH OIL PO           NEXIUM PO

## 2018-09-11 ENCOUNTER — OFFICE VISIT (OUTPATIENT)
Dept: FAMILY MEDICINE | Facility: CLINIC | Age: 38
End: 2018-09-11
Payer: COMMERCIAL

## 2018-09-11 VITALS
TEMPERATURE: 97.4 F | HEIGHT: 72 IN | DIASTOLIC BLOOD PRESSURE: 82 MMHG | BODY MASS INDEX: 30.34 KG/M2 | HEART RATE: 62 BPM | RESPIRATION RATE: 16 BRPM | SYSTOLIC BLOOD PRESSURE: 116 MMHG | WEIGHT: 224 LBS

## 2018-09-11 DIAGNOSIS — M54.42 ACUTE BILATERAL LOW BACK PAIN WITH LEFT-SIDED SCIATICA: Primary | ICD-10-CM

## 2018-09-11 PROCEDURE — 99214 OFFICE O/P EST MOD 30 MIN: CPT | Mod: 25 | Performed by: PHYSICIAN ASSISTANT

## 2018-09-11 PROCEDURE — 96372 THER/PROPH/DIAG INJ SC/IM: CPT | Performed by: PHYSICIAN ASSISTANT

## 2018-09-11 RX ORDER — METHYLPREDNISOLONE 4 MG
TABLET, DOSE PACK ORAL
Qty: 21 TABLET | Refills: 0 | Status: SHIPPED | OUTPATIENT
Start: 2018-09-11 | End: 2019-05-28

## 2018-09-11 RX ORDER — KETOROLAC TROMETHAMINE 30 MG/ML
30 INJECTION, SOLUTION INTRAMUSCULAR; INTRAVENOUS ONCE
Qty: 1 ML | Refills: 0 | OUTPATIENT
Start: 2018-09-11 | End: 2018-09-11

## 2018-09-11 ASSESSMENT — ENCOUNTER SYMPTOMS
PSYCHIATRIC NEGATIVE: 1
RESPIRATORY NEGATIVE: 1
NEUROLOGICAL NEGATIVE: 1
CONSTITUTIONAL NEGATIVE: 1
CARDIOVASCULAR NEGATIVE: 1
GASTROINTESTINAL NEGATIVE: 1
EYES NEGATIVE: 1

## 2018-09-11 NOTE — MR AVS SNAPSHOT
After Visit Summary   9/11/2018    Kathleen Clark    MRN: [id number]           Patient Information     Date Of Birth          1980        Visit Information        Provider Department      9/11/2018 9:10 AM Ingrid Haji PA-C [facility]        Today's Diagnoses     Acute bilateral low back pain with left-sided sciatica    -  1      Care Instructions    We discussed the diagnosis of acute low back pain.    I encouraged physical activity as tolerated.  Being sedentary will likely make symptoms worse.  They should return to work and activities as tolerated.  For pain control I recommended NSAIDs, acetaminophen, ice and/or heat.    ALSO take the medications as prescribed.     In most patients, symptoms should improve in 4-6 weeks.     The patient should go to an emergency room if they experience any of the following symptoms:  - Numbness in the genitals, anus, butt and inner thigh areas  - Change in bowel or bladder function  - Worsening weakness or change in coordination  - Fever or chills    I recommend follow up with their primary care provider or an orthopedic provider in 6 weeks if symptoms do not improve, or sooner if symptoms are getting worse.                     Follow-ups after your visit        Additional Services     [facility] PT, HAND, AND CHIROPRACTIC REFERRAL       **This order will print in the [facility] Scheduling Office**    Physical Therapy, Hand Therapy and Chiropractic Care are available through:    *[facility]  *[facility]  *[facility]    Call one number to schedule at any of the above locations: [phone].    Your provider has referred you to: [facility] - PT and/or Chiropractic Care determined by clinical presentation at [facility] or [facility] Initial Visit    Indication/Reason for Referral: Low Back Pain  Onset of Illness: 1 day, acute pain after a 15 hour flight  Therapy Orders:  Up to 8 visits  Special Programs: None  Special Request: Manual Therapy: Myofascial Release/Massage  Modalities: As Indicated  None    Mary Ann Reese      Additional Comments for the Therapist or Chiropractor:     Please be aware that coverage of these services is subject to the terms and limitations of your health insurance plan.  Call member services at your health plan with any benefit or coverage questions.      Please bring the following to your appointment:    *Your personal calendar for scheduling future appointments  *Comfortable clothing                  Who to contact     If you have questions or need follow up information about today's clinic visit or your schedule please contact [facility] directly at [phone].  Normal or non-critical lab and imaging results will be communicated to you by MyChart, letter or phone within 4 business days after the clinic has received the results. If you do not hear from us within 7 days, please contact the clinic through Aethon[employer] or phone. If you have a critical or abnormal lab result, we will notify you by phone as soon as possible.  Submit refill requests through F[employer]usion or call your pharmacy and they will forward the refill request to us. Please allow 3 business days for your refill to be completed.          Additional Information About Your Visit        A[employer]onhart Information     F[employer] gives you secure access to your electronic health record. If you see a primary care provider, you can also send messages to your care team and make appointments. If you have questions, please call your primary care clinic.  If you do not have a primary care provider, please call [phone] and they will assist you.        Care EveryWhere ID     This is your Care EveryWhere ID. This could be used by other organizations to access your [place] medical records  [id number]        Your Vitals Were     Pulse Temperature Respirations Height BMI (Body Mass  Index)       62 97.4  F (36.3  C) (Tympanic) 16 6' (1.829 m) 30.38 kg/m2        Blood Pressure from Last 3 Encounters:   09/11/18 116/82   08/31/18 102/76   04/27/18 104/76    Weight from Last 3 Encounters:   09/11/18 224 lb (101.6 kg)   08/31/18 221 lb 8 oz (100.5 kg)   04/27/18 226 lb 8 oz (102.7 kg)              We Performed the Following     [facility] PT, HAND, AND CHIROPRACTIC REFERRAL          Today's Medication Changes          These changes are accurate as of 9/11/18  9:22 AM.  If you have any questions, ask your nurse or doctor.               Start taking these medicines.        Dose/Directions    ketorolac 30 MG/ML injection   Commonly known as:  TORADOL   Used for:  Acute bilateral low back pain with left-sided sciatica   Started by:  Ingrid Haji PA-C        Dose:  30 mg   Inject 1 mL (30 mg) into the muscle once for 1 dose   Quantity:  1 mL   Refills:  0       methylPREDNISolone 4 MG tablet   Commonly known as:  MEDROL DOSEPAK   Used for:  Acute bilateral low back pain with left-sided sciatica   Started by:  Ingrid Haji PA-C        Follow package instructions   Quantity:  21 tablet   Refills:  0       tiZANidine 4 MG tablet   Commonly known as:  ZANAFLEX   Used for:  Acute bilateral low back pain with left-sided sciatica   Started by:  Ingrid Haji PA-C        Dose:  4 mg   Take 1 tablet (4 mg) by mouth 3 times daily as needed for muscle spasms   Quantity:  20 tablet   Refills:  0            Where to get your medicines      These medications were sent to [facility] [address] [place] [facility] [place] - [address]  [address] [facility] [address]     Phone:  [phone]     methylPREDNISolone 4 MG tablet    tiZANidine 4 MG tablet         Some of these will need a paper prescription and others can be bought over the counter.  Ask your nurse if you have questions.     You don't need a prescription for these medications     ketorolac 30 MG/ML injection                Primary Care  Provider Office Phone # Fax #    Chencho Morales [phone] [address] [facility] [address]        Equal Access to Services     [facility] : Hadii aad ku hadralpho Soomaali, waaxda luqadaha, qaybta kaalmada adeegyada, rick craign mariel summers laAbrahamnilesh wynn. So [facility] [phone].    ATENCIÓN: Si habla español, tiene a cruz disposición servicios gratuitos de asistencia lingüística. Llame al [phone].    We comply with applicable federal civil rights laws and [place] laws. We do not discriminate on the basis of race, color, national origin, age, disability, sex, sexual orientation, or gender identity.            Thank you!     Thank you for choosing [facility]  for your care. Our goal is always to provide you with excellent care. Hearing back from our patients is one way we can continue to improve our services. Please take a few minutes to complete the written survey that you may receive in the mail after your visit with us. Thank you!             Your Updated Medication List - Protect others around you: Learn how to safely use, store and throw away your medicines at www.disposemymeds.org.          This list is accurate as of 9/11/18  9:22 AM.  Always use your most recent med list.                   Brand Name Dispense Instructions for use Diagnosis    FISH OIL PO           ketorolac 30 MG/ML injection    TORADOL    1 mL    Inject 1 mL (30 mg) into the muscle once for 1 dose    Acute bilateral low back pain with left-sided sciatica       methylPREDNISolone 4 MG tablet    MEDROL DOSEPAK    21 tablet    Follow package instructions    Acute bilateral low back pain with left-sided sciatica       NEXIUM PO           tiZANidine 4 MG tablet    ZANAFLEX    20 tablet    Take 1 tablet (4 mg) by mouth 3 times daily as needed for muscle spasms    Acute bilateral low back pain with left-sided sciatica

## 2018-09-11 NOTE — NURSING NOTE
Prior to injection verified patient identity using patient's name and date of birth.  Due to injection administration, patient instructed to remain in clinic for 15 minutes  afterwards, and to report any adverse reaction to me immediately.    The following medication was given:     MEDICATION: Ondansetron Hydrochloride inj 4mg/2mL  ROUTE: IM  SITE: Ventrogluteal - Left  DOSE: 2ml  LOT #: [id number]  :  [employer] pro rx  EXPIRATION DATE:  1/2019  NDC#: 17914-808-85    Elva Duran CMA

## 2018-09-11 NOTE — PROGRESS NOTES
SUBJECTIVE:   Kathleen Clark is a 38 year old male who presents to clinic today for the following health issues:  Back Pain       Duration: yesterday around noon        Specific cause: 15 hour flight- picking up and putting down 2 year old daughter    Description:   Location of pain: low back right  Character of pain: stabbing and cramping  Pain radiation:radiates into the left buttocks  New numbness or weakness in legs, not attributed to pain:  no     Intensity: Currently 8-9/10    History:   Pain interferes with job: YES  History of back problems: no prior back problems  Any previous MRI or X-rays: None  Sees a specialist for back pain:  No  Therapies tried without relief: heat    Alleviating factors:   Improved by: NSAIDs      Precipitating factors:  Worsened by: Lifting and Bending, sitting straight in a chair    Red flag symptoms:  Denies saddle anesthesia, bowel and bladder dysfunction.    Denies fever and chills.    Denies recent IV drug use.    Denies recent weight loss or history of cancer.   Denies history of osteoporosis or prolonged steroid use.    Problem list and histories reviewed & adjusted, as indicated.  Additional history: as documented    Patient Active Problem List   Diagnosis     Pancreatic cyst     Past Surgical History:   Procedure Laterality Date     ABDOMEN SURGERY  2015    Whipple surgery     HERNIA REPAIR  2015    ventral hernia after the Whipple surgery       Social History   Substance Use Topics     Smoking status: Never Smoker     Smokeless tobacco: Never Used     Alcohol use Yes     Family History   Problem Relation Age of Onset     No Known Problems Mother      No Known Problems Father      No Known Problems Maternal Grandmother      Emphysema Maternal Grandfather      Asthma Maternal Grandfather      No Known Problems Paternal Grandmother      Prostate Cancer Paternal Grandfather      No Known Problems Brother      No Known Problems Sister      No Known Problems Daughter       No Known Problems Brother      No Known Problems Daughter          Current Outpatient Prescriptions   Medication Sig Dispense Refill     ketorolac (TORADOL) 30 MG/ML injection Inject 1 mL (30 mg) into the muscle once for 1 dose 1 mL 0     methylPREDNISolone (MEDROL DOSEPAK) 4 MG tablet Follow package instructions 21 tablet 0     methylPREDNISolone (MEDROL DOSEPAK) 4 MG tablet Follow package instructions 21 tablet 0     Omega-3 Fatty Acids (FISH OIL PO)        tiZANidine (ZANAFLEX) 4 MG tablet Take 1 tablet (4 mg) by mouth 3 times daily as needed for muscle spasms 20 tablet 0     tiZANidine (ZANAFLEX) 4 MG tablet Take 1 tablet (4 mg) by mouth 3 times daily as needed for muscle spasms 20 tablet 0     Esomeprazole Magnesium (NEXIUM PO)        No Known Allergies    Reviewed and updated as needed this visit by clinical staff  Tobacco  Allergies  Meds  Med Hx  Surg Hx  Fam Hx  Soc Hx      Reviewed and updated as needed this visit by Provider         Review of Systems   Constitutional: Negative.    HENT: Negative.    Eyes: Negative.    Respiratory: Negative.    Cardiovascular: Negative.    Gastrointestinal: Negative.    Genitourinary: Negative.    Musculoskeletal:        As in HPI   Skin: Negative.    Neurological: Negative.    Psychiatric/Behavioral: Negative.        OBJECTIVE:     /82 (Patient Position: Standing, Cuff Size: Adult Large)  Pulse 62  Temp 97.4  F (36.3  C) (Tympanic)  Resp 16  Ht 6' (1.829 m)  Wt 224 lb (101.6 kg)  BMI 30.38 kg/m2  Body mass index is 30.38 kg/(m^2).    Physical Exam   Constitutional: He is oriented to person, place, and time. He appears well-developed and well-nourished. No distress.   HENT:   Head: Normocephalic and atraumatic.   Nose: Nose normal.   Eyes: Conjunctivae and EOM are normal.   Neck: Normal range of motion.   Pulmonary/Chest: Effort normal.   Musculoskeletal:        Lumbar back: He exhibits decreased range of motion and pain. He exhibits no tenderness, no  bony tenderness and no swelling.   Neurological: He is alert and oriented to person, place, and time.   He can perform a deep squat.  He is not able to sit down due to the pain.  Sensation is intact in bilateral legs.  ROM is limited when walking and twisting due to pain.    Skin: Skin is warm and dry.   Psychiatric: He has a normal mood and affect. Judgment normal.       No results found for this or any previous visit (from the past 24 hour(s)).    ASSESSMENT/PLAN:       ICD-10-CM    1. Acute bilateral low back pain with left-sided sciatica M54.42 methylPREDNISolone (MEDROL DOSEPAK) 4 MG tablet     tiZANidine (ZANAFLEX) 4 MG tablet     [facility] PT, HAND, AND CHIROPRACTIC REFERRAL     ketorolac (TORADOL) 30 MG/ML injection     ZOFRAN 1 MG (ONDANSETRON) HCL INJECTION      INJECTION INTRAMUSCULAR OR SUB-Q     methylPREDNISolone (MEDROL DOSEPAK) 4 MG tablet     tiZANidine (ZANAFLEX) 4 MG tablet      MDM:  Patient is in a lot of pain today and the exam is limited.   We discussed red flag symptoms, and he does not have any at this time.   If his symptoms do not improve over the next week, I would consider a lumbar MRI to evaluate for a herniated disc.    All of his questions were answered today, he will contact us as needed.     Patient Instructions   We discussed the diagnosis of acute low back pain.    I encouraged physical activity as tolerated.  Being sedentary will likely make symptoms worse.  They should return to work and activities as tolerated.  For pain control I recommended NSAIDs, acetaminophen, ice and/or heat.    ALSO take the medications as prescribed.     In most patients, symptoms should improve in 4-6 weeks.     The patient should go to an emergency room if they experience any of the following symptoms:  - Numbness in the genitals, anus, butt and inner thigh areas  - Change in bowel or bladder function  - Worsening weakness or change in coordination  - Fever or chills    I recommend follow up with their  primary care provider or an orthopedic provider in 6 weeks if symptoms do not improve, or sooner if symptoms are getting worse.                 Valdez Haji PA-C  [facility]

## 2018-09-11 NOTE — PATIENT INSTRUCTIONS
We discussed the diagnosis of acute low back pain.    I encouraged physical activity as tolerated.  Being sedentary will likely make symptoms worse.  They should return to work and activities as tolerated.  For pain control I recommended NSAIDs, acetaminophen, ice and/or heat.    ALSO take the medications as prescribed.     In most patients, symptoms should improve in 4-6 weeks.     The patient should go to an emergency room if they experience any of the following symptoms:  - Numbness in the genitals, anus, butt and inner thigh areas  - Change in bowel or bladder function  - Worsening weakness or change in coordination  - Fever or chills    I recommend follow up with their primary care provider or an orthopedic provider in 6 weeks if symptoms do not improve, or sooner if symptoms are getting worse.

## 2018-09-11 NOTE — NURSING NOTE
Prior to injection verified patient identity using patient's name and date of birth.  Due to injection administration, patient instructed to remain in clinic for 15 minutes  afterwards, and to report any adverse reaction to me immediately.    The following medication was given:     MEDICATION: Toradol 30 mg  ROUTE: IM  SITE: Deltoid - Left  DOSE: 30 mg 1 ml  LOT #: [id number]  :  [employer]  EXPIRATION DATE:  10/19  NDC#: 58122-346-37     Elva Duran CMA

## 2019-05-28 ENCOUNTER — OFFICE VISIT (OUTPATIENT)
Dept: FAMILY MEDICINE | Facility: CLINIC | Age: 39
End: 2019-05-28
Payer: COMMERCIAL

## 2019-05-28 VITALS
WEIGHT: 203 LBS | SYSTOLIC BLOOD PRESSURE: 110 MMHG | HEIGHT: 72 IN | BODY MASS INDEX: 27.5 KG/M2 | DIASTOLIC BLOOD PRESSURE: 76 MMHG | RESPIRATION RATE: 18 BRPM | TEMPERATURE: 97.7 F | OXYGEN SATURATION: 99 % | HEART RATE: 62 BPM

## 2019-05-28 DIAGNOSIS — E66.3 OVERWEIGHT (BMI 25.0-29.9): ICD-10-CM

## 2019-05-28 DIAGNOSIS — J30.2 SEASONAL ALLERGIC RHINITIS, UNSPECIFIED TRIGGER: ICD-10-CM

## 2019-05-28 DIAGNOSIS — K86.1 CHRONIC RECURRENT PANCREATITIS (H): Primary | ICD-10-CM

## 2019-05-28 LAB
BASOPHILS # BLD AUTO: 0 10E9/L (ref 0–0.2)
BASOPHILS NFR BLD AUTO: 0.4 %
DIFFERENTIAL METHOD BLD: NORMAL
EOSINOPHIL # BLD AUTO: 0.1 10E9/L (ref 0–0.7)
EOSINOPHIL NFR BLD AUTO: 1.6 %
ERYTHROCYTE [DISTWIDTH] IN BLOOD BY AUTOMATED COUNT: 12.8 % (ref 10–15)
HCT VFR BLD AUTO: 45.1 % (ref 40–53)
HGB BLD-MCNC: 15.4 G/DL (ref 13.3–17.7)
LIPASE SERPL-CCNC: 71 U/L (ref 73–393)
LYMPHOCYTES # BLD AUTO: 1.7 10E9/L (ref 0.8–5.3)
LYMPHOCYTES NFR BLD AUTO: 32.8 %
MCH RBC QN AUTO: 30.6 PG (ref 26.5–33)
MCHC RBC AUTO-ENTMCNC: 34.1 G/DL (ref 31.5–36.5)
MCV RBC AUTO: 90 FL (ref 78–100)
MONOCYTES # BLD AUTO: 0.4 10E9/L (ref 0–1.3)
MONOCYTES NFR BLD AUTO: 7.3 %
NEUTROPHILS # BLD AUTO: 3 10E9/L (ref 1.6–8.3)
NEUTROPHILS NFR BLD AUTO: 57.9 %
PLATELET # BLD AUTO: 196 10E9/L (ref 150–450)
RBC # BLD AUTO: 5.04 10E12/L (ref 4.4–5.9)
WBC # BLD AUTO: 5.1 10E9/L (ref 4–11)

## 2019-05-28 PROCEDURE — 83690 ASSAY OF LIPASE: CPT | Performed by: FAMILY MEDICINE

## 2019-05-28 PROCEDURE — 85025 COMPLETE CBC W/AUTO DIFF WBC: CPT | Performed by: FAMILY MEDICINE

## 2019-05-28 PROCEDURE — 82150 ASSAY OF AMYLASE: CPT | Performed by: FAMILY MEDICINE

## 2019-05-28 PROCEDURE — 36415 COLL VENOUS BLD VENIPUNCTURE: CPT | Performed by: FAMILY MEDICINE

## 2019-05-28 PROCEDURE — 99214 OFFICE O/P EST MOD 30 MIN: CPT | Performed by: FAMILY MEDICINE

## 2019-05-28 ASSESSMENT — ANXIETY QUESTIONNAIRES
1. FEELING NERVOUS, ANXIOUS, OR ON EDGE: NOT AT ALL
5. BEING SO RESTLESS THAT IT IS HARD TO SIT STILL: NOT AT ALL
GAD7 TOTAL SCORE: 0
2. NOT BEING ABLE TO STOP OR CONTROL WORRYING: NOT AT ALL
3. WORRYING TOO MUCH ABOUT DIFFERENT THINGS: NOT AT ALL
6. BECOMING EASILY ANNOYED OR IRRITABLE: NOT AT ALL
IF YOU CHECKED OFF ANY PROBLEMS ON THIS QUESTIONNAIRE, HOW DIFFICULT HAVE THESE PROBLEMS MADE IT FOR YOU TO DO YOUR WORK, TAKE CARE OF THINGS AT HOME, OR GET ALONG WITH OTHER PEOPLE: NOT DIFFICULT AT ALL
7. FEELING AFRAID AS IF SOMETHING AWFUL MIGHT HAPPEN: NOT AT ALL

## 2019-05-28 ASSESSMENT — PATIENT HEALTH QUESTIONNAIRE - PHQ9
5. POOR APPETITE OR OVEREATING: NOT AT ALL
SUM OF ALL RESPONSES TO PHQ QUESTIONS 1-9: 0

## 2019-05-28 ASSESSMENT — MIFFLIN-ST. JEOR: SCORE: 1888.8

## 2019-05-28 NOTE — PATIENT INSTRUCTIONS
1.  Weight Loss Tips  1. Do not eat after 6 hrs before your expected bedtime  2. Have your heaviest meal for breakfast, a slightly lighter meal at lunch and a snack 6 hrs before bed  3. No sugar/calorie drinks except milk ie no fruit juice, pop, alcohol.  4. Drink milk 30min before meals to decrease your hunger. Also it is excellent as part of your last meal of the day snack  5. Drink lots of water  6. Increase fiber in diet: all bran cereal, salads, popcorn etc  7. Have only one small serving of fruit a day about 1/2 cup (as this is high in sugar)  8. EXERCISE is the bottom line. Without it, you will gain weight even on a low calorie diet. Best if done 2-3X a day as can    Being overweight contributes to high blood pressure and high cholesterol, both of which cause heart attacks, strokes and kidney failure, prediabetes and diabetes, arthritis, and liver disease     3.  Run a cold air vaporizer as much as possible. If you cannot,  boil water and breath the warm vapors 2-3 times a day to try to open up the sinuses take 2400mgm of guaifenesin per 24 hours   You can do this by taking  Mucinex plain blue  1200 mg  One tablet twice a day (This may come as 600mg/tablet and you need to take 2 tabs twice a day) or you could buy the cheaper  generic 400mgm / tab and take 2 tablets 3 x a day or 1 and 1/2 tablets 4 x a day . .Guaifenesin is  the major component of most cough syrups, because it makes the mucus less thick, and therefore it drains out better and you are less likely to cough from it dripping on the back of your throat.  Irrigate the  nose with plain water under the kitchen sink faucet or the shower.  Noemí pots, spray bottles, etc accumulate bacteria and are not recommended.   The tickle in the throat is also helped by gargling with vinegar and honey mixture, or pop or mouth wash as these coat the throat.  Please try to rinse teeth with water after using these .   Do not use sudafed or pseudephedrine as it dries  the mucus up so it is harder to get it out, and it can raise your BP     4. Consider antihistamines= antiallergens:fro least to most powerful and from least to most drowsy :   Loratadine, clariten, alavert        Loratadine, clariten, alavert 10mgm a day        Fexofenadine= allegra 180mgm a day                                                                                                                                                       Cetirizine=zyrtec  10mgm a day        Benadryl=diphenhydramine  25-50 mgm- only at bedtime-can be used together with one of the above taken during the day    5. To help heal the  High acid causing your heartburn, we will prescribe an acid inhibitor that stops the stomach from producing acid. Please do not eat any acid, including oranges and citric acid fruits, any form of tomato, caffeine in coffee, tea and pop, no NSAIDs including aleve, advil, ibuprofen, and naprosyn NO alcohol. No vitamin C  Which is ascorbic acid   Eat your last food and drink> 5 hrs prior to bedtime  And sleep sitting upright     6. chek out your TD last one in 11 y/o

## 2019-05-28 NOTE — LETTER
May 30, 2019      Kathleen Clark  [address] [place] [address]        Dear ,    We are writing to inform you of your test results.    They are all normal     THE FOLLOWING ARE EXPLANATIONS OF SOME OF OUR LAB TESTS     YOU DID NOT NECESSARILY HAVE ALL OF THESE DONE     Hgb is the blood iron level   WBC means White Blood Cells   Platelets are small blood    cells that help with forming the blood clots along with other blood factors.   Electrolytes are Sodium, Potassium, Calcium, Magnesium, Phosphorus.   Liver tests are: AST, ALT, Bilirubin, Alkaline Phosphatase.   Kidney tests are Creatinine, GFR.   HDL Choles   terol - is the good cholesterol and it is good to have it high.   LDL cholesterol is the bad cholesterol and it is good to have it low.   It is recommended to have LDL less than 130 for people with hypertension and to have it less than 100 for people with   heart disease, diabetes and chronic kidney disease.   Triglycerides are another type of lipid that can cause heart disease, like the cholesterol and should be kept low   Thyroid tests are TSH, T4, T3   Glucose is sugar.   A1c is a test that gives us an idea    about how well was controlled the diabetes for the last 3 months.   PSA stands for Prostate Specific Antigen and it can be elevated with prostate cancer or prostate inflammation.     Lipase & amylase are from the pancreas     Please continue on the same medications     Resulted Orders   Amylase   Result Value Ref Range    Amylase 23 (L) 30 - 110 U/L   Lipase   Result Value Ref Range    Lipase 71 (L) 73 - 393 U/L   CBC with platelets differential   Result Value Ref Range    WBC 5.1 4.0 - 11.0 10e9/L    RBC Count 5.04 4.4 - 5.9 10e12/L    Hemoglobin 15.4 13.3 - 17.7 g/dL    Hematocrit 45.1 40.0 - 53.0 %    MCV 90 78 - 100 fl    MCH 30.6 26.5 - 33.0 pg    MCHC 34.1 31.5 - 36.5 g/dL    RDW 12.8 10.0 - 15.0 %    Platelet Count 196 150 - 450 10e9/L    % Neutrophils 57.9 %     % Lymphocytes 32.8 %    % Monocytes 7.3 %    % Eosinophils 1.6 %    % Basophils 0.4 %    Absolute Neutrophil 3.0 1.6 - 8.3 10e9/L    Absolute Lymphocytes 1.7 0.8 - 5.3 10e9/L    Absolute Monocytes 0.4 0.0 - 1.3 10e9/L    Absolute Eosinophils 0.1 0.0 - 0.7 10e9/L    Absolute Basophils 0.0 0.0 - 0.2 10e9/L    Diff Method Automated Method        If you have any questions or concerns, please call the clinic at the number listed above.       Sincerely,        Marisabel Mir MD

## 2019-05-28 NOTE — PROGRESS NOTES
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 -travels for job with frequent heavy meals and alcohol     Amylase and lipase wnl since the Whipple    Description (location/character/radiation): Pulsating feeling in Abdomen       Associated flank pain: None    Intensity:  mild    Accompanying signs and symptoms:        Fever/Chills: no        Gas/Bloating: no        Nausea/vomitting: no     SUBJECTIVE:   Kathleen Clark is a 37 year old male who presents to clinic today for the following health issues:      GERD/Heartburn  Onset: 5 days    Description:     Burning in chest: YES & epigastric     Intensity: moderate    Progression of Symptoms: same and intermittent    Accompanying Signs & Symptoms:  Does it feel like food gets stuck: no   Nausea: YES  Vomiting (bloody?): no   Abdominal Pain: YES  Black-Tarry stools: no :  Bloody stools: no     History:   Previous ulcers: no     Precipitating factors:   Caffeine use: YES  Alcohol use: YES  NSAID/Aspirin use: no   Tobacco use: no   Worse with fatty foods and alcohol.    Alleviating factors:  Nexium    Therapies Tried and outcome:Nexium, started taking medication on Tuesday  He reports that he is feeling better, almost no symptoms now    Hx of pancreatitis, pancreatic cyst in 2015.  He underwent surgery, had a Whipple procedure done removing the pancreatic cyst and a portion of the head of the pancreas    He has been travelling, was in [facility].  He knows that he had eating a larger very heavy meal and had more alcohol than usual when symptoms started  Since then he has been eating very small amounts, no alcohol, avoiding coffee --only eating white bread   As he has done in the past to calm the symptoms     He leaves again on Monday, travelling to [place] for 10 days             Problem list and histories reviewed & adjusted, as indicated.  Additional history: as documented    Labs reviewed in EPIC    Reviewed and updated as needed this visit by clinical staff  Tobacco  Allergies  Meds  Problems  Med Hx  Surg Hx  Fam Hx       Reviewed and updated as needed this visit by Provider  Tobacco  Allergies  Meds  Problems  Med Hx  Surg Hx  Fam Hx         ROS:  CONSTITUTIONAL:NEGATIVE for fever, chills, change in weight  RESP:NEGATIVE for significant cough or SOB  CV: NEGATIVE for chest pain, palpitations or peripheral edema  GI: POSITIVE for abdominal pain epigastric and LUQ and NEGATIVE for constipation, diarrhea, nausea, vomiting and weight loss  : negative for dysuria, hematuria, decreased urinary stream, erectile dysfunction    OBJECTIVE:                                                    /76   Pulse 62   Temp 97.7  F (36.5  C) (Tympanic)   Resp 18   Ht 1.829 m (6')   Wt 92.1 kg (203 lb)   SpO2 99%   BMI 27.53 kg/m    Body mass index is 27.53 kg/m .  GENERAL APPEARANCE: healthy, alert and no distress  NECK: no adenopathy, no asymmetry, masses, or scars, thyroid normal to palpation and no bruits  RESP: lungs clear to auscultation - no rales, rhonchi or wheezes  CV: regular rates and rhythm, normal S1 S2, no S3 or S4 and no murmur, click or rub  ABDOMEN: soft, nontender, without hepatosplenomegaly or masses and bowel sounds normal    Diagnostic test results:  Lab: see below,results pending     ASSESSMENT/PLAN:                                                        ICD-10-CM    1. Chronic recurrent pancreatitis (H) s/po resection of head of pancreas w cyst & Whipple in 2015 K86.1 Amylase     Lipase     CBC with platelets differential     GASTROENTEROLOGY ADULT REF CONSULT ONLY   2. Seasonal allergic rhinitis, unspecified trigger J30.2    3.  Overweight (BMI 25.0-29.9) E66.3        Follow up with Provider - as needed if not continuing to improve  Continue taking the Nexium    He will get signed up for [employer] access  Patient Instructions   1.  Weight Loss Tips  1. Do not eat after 6 hrs before your expected bedtime  2. Have your heaviest meal for breakfast, a slightly lighter meal at lunch and a snack 6 hrs before bed  3. No sugar/calorie drinks except milk ie no fruit juice, pop, alcohol.  4. Drink milk 30min before meals to decrease your hunger. Also it is excellent as part of your last meal of the day snack  5. Drink lots of water  6. Increase fiber in diet: all bran cereal, salads, popcorn etc  7. Have only one small serving of fruit a day about 1/2 cup (as this is high in sugar)  8. EXERCISE is the bottom line. Without it, you will gain weight even on a low calorie diet. Best if done 2-3X a day as can    Being overweight contributes to high blood pressure and high cholesterol, both of which cause heart attacks, strokes and kidney failure, prediabetes and diabetes, arthritis, and liver disease     3.  Run a cold air vaporizer as much as possible. If you cannot,  boil water and breath the warm vapors 2-3 times a day to try to open up the sinuses take 2400mgm of guaifenesin per 24 hours   You can do this by taking  Mucinex plain blue  1200 mg  One tablet twice a day (This may come as 600mg/tablet and you need to take 2 tabs twice a day) or you could buy the cheaper  generic 400mgm / tab and take 2 tablets 3 x a day or 1 and 1/2 tablets 4 x a day . .Guaifenesin is  the major component of most cough syrups, because it makes the mucus less thick, and therefore it drains out better and you are less likely to cough from it dripping on the back of your throat.  Irrigate the  nose with plain water under the kitchen sink faucet or the shower.  Noemí pots, spray bottles, etc accumulate bacteria and are not recommended.   The tickle in the throat is also helped  by gargling with vinegar and honey mixture, or pop or mouth wash as these coat the throat.  Please try to rinse teeth with water after using these .   Do not use sudafed or pseudephedrine as it dries the mucus up so it is harder to get it out, and it can raise your BP     4. Consider antihistamines= antiallergens:fro least to most powerful and from least to most drowsy :   Loratadine, clariten, alavert        Loratadine, clariten, alavert 10mgm a day        Fexofenadine= allegra 180mgm a day                                                                                                                                                       Cetirizine=zyrtec  10mgm a day        Benadryl=diphenhydramine  25-50 mgm- only at bedtime-can be used together with one of the above taken during the day    5. To help heal the  High acid causing your heartburn, we will prescribe an acid inhibitor that stops the stomach from producing acid. Please do not eat any acid, including oranges and citric acid fruits, any form of tomato, caffeine in coffee, tea and pop, no NSAIDs including aleve, advil, ibuprofen, and naprosyn NO alcohol. No vitamin C  Which is ascorbic acid   Eat your last food and drink> 5 hrs prior to bedtime  And sleep sitting upright     6. chek out your TD last one in 9 y/o     [facility]         Diarrhea: no        Dysuria or Hematuria: no     History (previous similar pain/trauma/previous testing): Pancreaitis    Precipitating or alleviating factors:       Pain worse with eating/BM/urination: Yes       Pain relieved by BM: no     Therapies tried and outcome: Nexium    ENT Symptoms: Allergic Seasonal Rhinitis              Symptoms: cc Present Absent Comment   Fever/Chills   x    Fatigue   x    Muscle Aches   x    Eye Irritation   x    Sneezing   x    Nasal Paco/Drg  x     Sinus Pressure/Pain  x     Loss of smell   x    Dental pain   x    Sore Throat   x    Swollen Glands   x    Ear  Pain/Fullness   x    Cough  x     Wheeze   x    Chest Pain   x    Shortness of breath   x    Rash   x    Other   x      Symptom duration:  >6 weeks   Symptom severity:  Mild   Treatments tried:  Zpak and Tessalon without help    Contacts:  Home   No prior PMH of allergies       Depression and Anxiety Follow-Up      How are you doing with your depression since your last visit? No change    How are you doing with your anxiety since your last visit?  No change    Are you having other symptoms that might be associated with depression or anxiety? No    Have you had a significant life event? No     Do you have any concerns with your use of alcohol or other drugs? No  PHQ-9 = 0   SONG = -     Social History     Tobacco Use     Smoking status: Never Smoker     Smokeless tobacco: Never Used   Substance Use Topics     Alcohol use: Yes     Drug use: No     PHQ 4/27/2018 5/28/2019   PHQ-9 Total Score 11 0   Q9: Thoughts of better off dead/self-harm past 2 weeks Not at all Not at all     SONG-7 SCORE 4/27/2018 5/28/2019   Total Score 11 0     See above     Suicide Assessment Five-step Evaluation and Treatment (SAFE-T)    OVERWEIGHT    -BMI = 28  -no comorbid         Amount of exercise or physical activity: None    Problems taking medications regularly: No    Medication side effects: none    Diet: regular (no restrictions)              Reviewed and updated as needed this visit by Provider  Tobacco  Allergies  Meds  Problems  Med Hx  Surg Hx  Fam Hx         Marisabel Mir MD

## 2019-05-28 NOTE — NURSING NOTE
Chief Complaint   Patient presents with     Abdominal Pain     /76   Pulse 62   Temp 97.7  F (36.5  C) (Tympanic)   Resp 18   Ht 1.829 m (6')   Wt 92.1 kg (203 lb)   SpO2 99%   BMI 27.53 kg/m   Estimated body mass index is 27.53 kg/m  as calculated from the following:    Height as of this encounter: 1.829 m (6').    Weight as of this encounter: 92.1 kg (203 lb).  BP completed using cuff size: regular   Renita Lomeli CMA    Health Maintenance Due   Topic Date Due     PREVENTIVE CARE VISIT  1982     HIV SCREENING  09/06/1997     DTAP/TDAP/TD IMMUNIZATION (1 - Tdap) 09/06/2007     LIPID  09/06/2017     PHQ-9  10/27/2018     SONG ASSESSMENT  04/27/2019     Health Maintenance reviewed at today's visit patient asked to schedule/complete:   Routine Health Visit: Patient agrees to schedule  Depression:  Patient agrees to schedule  Immunizations:  Patient agrees to schedule

## 2019-05-29 LAB — AMYLASE SERPL-CCNC: 23 U/L (ref 30–110)

## 2019-05-29 ASSESSMENT — ANXIETY QUESTIONNAIRES: GAD7 TOTAL SCORE: 0

## 2019-05-30 NOTE — RESULT ENCOUNTER NOTE
.  Please see attached lab results  They are all normal     THE FOLLOWING ARE EXPLANATIONS OF SOME OF OUR LAB TESTS     YOU DID NOT NECESSARILY HAVE ALL OF THESE DONE     Hgb is the blood iron level  WBC means White Blood Cells  Platelets are small blood   cells that help with forming the blood clots along with other blood factors.  Electrolytes are Sodium, Potassium, Calcium, Magnesium, Phosphorus.  Liver tests are: AST, ALT, Bilirubin, Alkaline Phosphatase.  Kidney tests are Creatinine, GFR.  HDL Choles  terol - is the good cholesterol and it is good to have it high.  LDL cholesterol is the bad cholesterol and it is good to have it low.  It is recommended to have LDL less than 130 for people with hypertension and to have it less than 100 for people with   heart disease, diabetes and chronic kidney disease.  Triglycerides are another type of lipid that can cause heart disease, like the cholesterol and should be kept low   Thyroid tests are TSH, T4, T3  Glucose is sugar.  A1c is a test that gives us an idea   about how well was controlled the diabetes for the last 3 months.   PSA stands for Prostate Specific Antigen and it can be elevated with prostate cancer or prostate inflammation.    Lipase & amylase are from the pancreas     Please continue on the same medications

## 2019-05-31 PROBLEM — J30.2 SEASONAL ALLERGIC RHINITIS, UNSPECIFIED TRIGGER: Status: ACTIVE | Noted: 2019-05-31

## 2019-05-31 PROBLEM — K86.1 CHRONIC RECURRENT PANCREATITIS (H): Status: ACTIVE | Noted: 2019-05-31

## 2019-05-31 PROBLEM — E66.3 OVERWEIGHT (BMI 25.0-29.9): Status: ACTIVE | Noted: 2019-05-31

## 2019-06-28 ENCOUNTER — OFFICE VISIT (OUTPATIENT)
Dept: FAMILY MEDICINE | Facility: CLINIC | Age: 39
End: 2019-06-28
Payer: COMMERCIAL

## 2019-06-28 VITALS
SYSTOLIC BLOOD PRESSURE: 120 MMHG | RESPIRATION RATE: 16 BRPM | TEMPERATURE: 98.6 F | BODY MASS INDEX: 27.53 KG/M2 | WEIGHT: 203 LBS | HEART RATE: 73 BPM | DIASTOLIC BLOOD PRESSURE: 80 MMHG

## 2019-06-28 DIAGNOSIS — F33.1 MODERATE EPISODE OF RECURRENT MAJOR DEPRESSIVE DISORDER (H): ICD-10-CM

## 2019-06-28 DIAGNOSIS — F41.1 GAD (GENERALIZED ANXIETY DISORDER): ICD-10-CM

## 2019-06-28 DIAGNOSIS — F33.1 MODERATE EPISODE OF RECURRENT MAJOR DEPRESSIVE DISORDER (H): Primary | ICD-10-CM

## 2019-06-28 PROCEDURE — 99214 OFFICE O/P EST MOD 30 MIN: CPT | Performed by: PHYSICIAN ASSISTANT

## 2019-06-28 ASSESSMENT — ANXIETY QUESTIONNAIRES
2. NOT BEING ABLE TO STOP OR CONTROL WORRYING: NEARLY EVERY DAY
6. BECOMING EASILY ANNOYED OR IRRITABLE: NEARLY EVERY DAY
GAD7 TOTAL SCORE: 16
7. FEELING AFRAID AS IF SOMETHING AWFUL MIGHT HAPPEN: MORE THAN HALF THE DAYS
1. FEELING NERVOUS, ANXIOUS, OR ON EDGE: MORE THAN HALF THE DAYS
5. BEING SO RESTLESS THAT IT IS HARD TO SIT STILL: SEVERAL DAYS
4. TROUBLE RELAXING: MORE THAN HALF THE DAYS
GAD7 TOTAL SCORE: 16
3. WORRYING TOO MUCH ABOUT DIFFERENT THINGS: NEARLY EVERY DAY
GAD7 TOTAL SCORE: 16
7. FEELING AFRAID AS IF SOMETHING AWFUL MIGHT HAPPEN: MORE THAN HALF THE DAYS

## 2019-06-28 ASSESSMENT — PATIENT HEALTH QUESTIONNAIRE - PHQ9
SUM OF ALL RESPONSES TO PHQ QUESTIONS 1-9: 13
10. IF YOU CHECKED OFF ANY PROBLEMS, HOW DIFFICULT HAVE THESE PROBLEMS MADE IT FOR YOU TO DO YOUR WORK, TAKE CARE OF THINGS AT HOME, OR GET ALONG WITH OTHER PEOPLE: VERY DIFFICULT
SUM OF ALL RESPONSES TO PHQ QUESTIONS 1-9: 13

## 2019-06-28 ASSESSMENT — ENCOUNTER SYMPTOMS
GASTROINTESTINAL NEGATIVE: 1
CARDIOVASCULAR NEGATIVE: 1
NEUROLOGICAL NEGATIVE: 1
EYES NEGATIVE: 1
MUSCULOSKELETAL NEGATIVE: 1
CONSTITUTIONAL NEGATIVE: 1
RESPIRATORY NEGATIVE: 1

## 2019-06-28 NOTE — PROGRESS NOTES
Subjective     Kathleen Clark is a 38 year old male who presents to clinic today for the following health issues:    HPI   Abnormal Mood Symptoms      Duration: comes and goes past 10 years     Description:  Depression: YES  Anxiety: YES  Panic attacks: no     Accompanying signs and symptoms: see PHQ-9 and SONG scores    History (similar episodes/previous evaluation): None    Precipitating or alleviating factors: None    Therapies tried and outcome: diet changes and exercise    PHQ-9 SCORE 4/27/2018 5/28/2019 6/28/2019   PHQ-9 Total Score MyChart - - 13 (Moderate depression)   PHQ-9 Total Score 11 0 13     SONG-7 SCORE 4/27/2018 5/28/2019 6/28/2019   Total Score - - 16 (severe anxiety)   Total Score 11 0 16     He would like to start with therapy and medication  Denies suicidal ideation, denies homicidal ideation        Patient Active Problem List   Diagnosis     Pancreatic cyst     Chronic recurrent pancreatitis (H) s/po resection of head of pancreas w cyst & Whipple in 2015     Seasonal allergic rhinitis, unspecified trigger     Overweight (BMI 25.0-29.9)     Past Surgical History:   Procedure Laterality Date     ABDOMEN SURGERY  2015    Whipple surgery     HERNIA REPAIR  2015    ventral hernia after the Whipple surgery       Social History     Tobacco Use     Smoking status: Never Smoker     Smokeless tobacco: Never Used   Substance Use Topics     Alcohol use: Yes     Family History   Problem Relation Age of Onset     No Known Problems Mother      No Known Problems Father      No Known Problems Maternal Grandmother      Emphysema Maternal Grandfather      Asthma Maternal Grandfather      No Known Problems Paternal Grandmother      Prostate Cancer Paternal Grandfather      No Known Problems Brother      No Known Problems Sister      No Known Problems Daughter      No Known Problems Brother      No Known Problems Daughter          Current Outpatient Medications   Medication Sig Dispense Refill     Esomeprazole  Magnesium (NEXIUM PO)        FLUoxetine (PROZAC) 20 MG capsule Take 1 capsule (20 mg) by mouth daily Start with one half tab daily for the first week then increase to one tab daily 30 capsule 1     Omega-3 Fatty Acids (FISH OIL PO)        No Known Allergies    Reviewed and updated as needed this visit by Provider         Review of Systems   Constitutional: Negative.    HENT: Negative.    Eyes: Negative.    Respiratory: Negative.    Cardiovascular: Negative.    Gastrointestinal: Negative.    Genitourinary: Negative.    Musculoskeletal: Negative.    Skin: Negative.    Neurological: Negative.    Psychiatric/Behavioral:        As in HPI         Objective    /80 (Cuff Size: Adult Regular)   Pulse 73   Temp 98.6  F (37  C) (Tympanic)   Resp 16   Wt 92.1 kg (203 lb)   BMI 27.53 kg/m    Physical Exam   Constitutional: He is oriented to person, place, and time. He appears well-developed and well-nourished. No distress.   HENT:   Head: Normocephalic.   Right Ear: External ear normal.   Left Ear: External ear normal.   Nose: Nose normal.   Eyes: Conjunctivae and EOM are normal.   Neck: Normal range of motion.   Pulmonary/Chest: Effort normal.   Neurological: He is alert and oriented to person, place, and time.   Skin: He is not diaphoretic.   Psychiatric: He has a normal mood and affect. Judgment normal.       Diagnostic Test Results:  No results found for this or any previous visit (from the past 24 hour(s)).        Assessment & Plan   Problem List Items Addressed This Visit     None      Visit Diagnoses     Moderate episode of recurrent major depressive disorder (H)    -  Primary    Relevant Medications    FLUoxetine (PROZAC) 20 MG capsule    Other Relevant Orders    MENTAL HEALTH REFERRAL  - Adult; Outpatient Treatment; Individual/Couples/Family/Group Therapy/Health Psychology; [facility]: [facility] [phone]; We will contact you to schedule the appointment or please call with any questions    SONG  (generalized anxiety disorder)        Relevant Medications    FLUoxetine (PROZAC) 20 MG capsule    Other Relevant Orders    MENTAL HEALTH REFERRAL  - Adult; Outpatient Treatment; Individual/Couples/Family/Group Therapy/Health Psychology[place] [facility] [facility] [phone]; We will contact you to schedule the appointment or please call with any questions         We will start with fluoxetine  Cem in 4 weeks  He will contact therapy to schedule    27 minutes were spent with the patient, greater than 50% of our time was spent in counseling.      BMI:   Estimated body mass index is 27.53 kg/m  as calculated from the following:    Height as of 5/28/19: 1.829 m (6').    Weight as of 5/28/19: 92.1 kg (203 lb).       There are no Patient Instructions on file for this visit.    Return in about 4 weeks (around 7/26/2019) for E-Visit, Depression Recheck, Anxiety Recheck.    Ingrid Haji PA-C  [facility]

## 2019-06-29 ENCOUNTER — TELEPHONE (OUTPATIENT)
Dept: FAMILY MEDICINE | Facility: CLINIC | Age: 39
End: 2019-06-29

## 2019-06-29 DIAGNOSIS — F41.1 GAD (GENERALIZED ANXIETY DISORDER): ICD-10-CM

## 2019-06-29 DIAGNOSIS — F33.1 MODERATE EPISODE OF RECURRENT MAJOR DEPRESSIVE DISORDER (H): ICD-10-CM

## 2019-06-29 ASSESSMENT — ANXIETY QUESTIONNAIRES: GAD7 TOTAL SCORE: 16

## 2019-06-29 NOTE — TELEPHONE ENCOUNTER
FLUoxetine (PROZAC) 20 MG capsule     You wrote cor caps , but then said take one half tablet? Did you mean to prescribe tablets instead of caps? please clarify and resend.  Thanks

## 2019-07-01 RX ORDER — FLUOXETINE 20 MG/1
10 TABLET, FILM COATED ORAL DAILY
Qty: 30 TABLET | Refills: 1 | Status: SHIPPED | OUTPATIENT
Start: 2019-07-01 | End: 2019-08-26

## 2019-08-26 ENCOUNTER — E-VISIT (OUTPATIENT)
Dept: FAMILY MEDICINE | Facility: CLINIC | Age: 39
End: 2019-08-26
Payer: COMMERCIAL

## 2019-08-26 DIAGNOSIS — F33.1 MODERATE EPISODE OF RECURRENT MAJOR DEPRESSIVE DISORDER (H): ICD-10-CM

## 2019-08-26 PROCEDURE — 99444 ZZC PHYSICIAN ONLINE EVALUATION & MANAGEMENT SERVICE: CPT | Performed by: PHYSICIAN ASSISTANT

## 2019-08-26 RX ORDER — FLUOXETINE 20 MG/1
20 TABLET, FILM COATED ORAL DAILY
Qty: 90 TABLET | Refills: 1 | Status: SHIPPED | OUTPATIENT
Start: 2019-08-26 | End: 2020-03-01

## 2019-08-26 ASSESSMENT — ANXIETY QUESTIONNAIRES
GAD7 TOTAL SCORE: 7
7. FEELING AFRAID AS IF SOMETHING AWFUL MIGHT HAPPEN: SEVERAL DAYS
7. FEELING AFRAID AS IF SOMETHING AWFUL MIGHT HAPPEN: SEVERAL DAYS
6. BECOMING EASILY ANNOYED OR IRRITABLE: SEVERAL DAYS
5. BEING SO RESTLESS THAT IT IS HARD TO SIT STILL: SEVERAL DAYS
1. FEELING NERVOUS, ANXIOUS, OR ON EDGE: SEVERAL DAYS
2. NOT BEING ABLE TO STOP OR CONTROL WORRYING: SEVERAL DAYS
GAD7 TOTAL SCORE: 7
3. WORRYING TOO MUCH ABOUT DIFFERENT THINGS: SEVERAL DAYS
GAD7 TOTAL SCORE: 7
4. TROUBLE RELAXING: SEVERAL DAYS

## 2019-08-26 ASSESSMENT — PATIENT HEALTH QUESTIONNAIRE - PHQ9
10. IF YOU CHECKED OFF ANY PROBLEMS, HOW DIFFICULT HAVE THESE PROBLEMS MADE IT FOR YOU TO DO YOUR WORK, TAKE CARE OF THINGS AT HOME, OR GET ALONG WITH OTHER PEOPLE: SOMEWHAT DIFFICULT
SUM OF ALL RESPONSES TO PHQ QUESTIONS 1-9: 4
SUM OF ALL RESPONSES TO PHQ QUESTIONS 1-9: 4

## 2019-08-27 ASSESSMENT — ANXIETY QUESTIONNAIRES: GAD7 TOTAL SCORE: 7

## 2019-08-27 ASSESSMENT — PATIENT HEALTH QUESTIONNAIRE - PHQ9: SUM OF ALL RESPONSES TO PHQ QUESTIONS 1-9: 4

## 2019-08-27 NOTE — TELEPHONE ENCOUNTER
PHQ-9 SCORE 5/28/2019 6/28/2019 8/26/2019   PHQ-9 Total Score MyChart - 13 (Moderate depression) 4 (Minimal depression)   PHQ-9 Total Score 0 13 4

## 2019-10-29 ENCOUNTER — TELEPHONE (OUTPATIENT)
Dept: FAMILY MEDICINE | Facility: CLINIC | Age: 39
End: 2019-10-29

## 2019-10-29 NOTE — LETTER
October 29, 2019    Kathleen Clark  [address] [place] [address]    Dear Etelvina Wang cares about your health and your health plan.  I have reviewed your medical conditions, medication list and lab results, and am making recommendations based on this review to better manage your health.    You are in particular need of attention regarding:  -Depression/Anxiety    I am recommending that you:     Please complete the attached PHQ9        Please call us at the [place] location:  [phone] or use [employer] to address the above recommendations.     Thank you for trusting [facility].  We appreciate the opportunity to serve you and look forward to supporting your healthcare in the future.    If you have (or plan to have) any of these tests done at a facility other than a [facility] or a [facility], please have the results sent to the [facility] location noted above.      Best Regards,    ALICE Monroe

## 2019-11-19 ENCOUNTER — MYC MEDICAL ADVICE (OUTPATIENT)
Dept: FAMILY MEDICINE | Facility: CLINIC | Age: 39
End: 2019-11-19

## 2019-11-19 DIAGNOSIS — F41.1 GAD (GENERALIZED ANXIETY DISORDER): ICD-10-CM

## 2019-11-19 DIAGNOSIS — F33.1 MODERATE EPISODE OF RECURRENT MAJOR DEPRESSIVE DISORDER (H): Primary | ICD-10-CM

## 2019-11-20 NOTE — TELEPHONE ENCOUNTER
Last OV 06/28/2019 & e-visit 08/26/2019.    Rx for Fluoxetine capsule pended. Please advice on approval. Should pt complete PHQ9 or start e-visit before refill?      PHQ-9 SCORE 5/28/2019 6/28/2019 8/26/2019   PHQ-9 Total Score MyChart - 13 (Moderate depression) 4 (Minimal depression)   PHQ-9 Total Score 0 13 4

## 2019-11-21 ENCOUNTER — MYC MEDICAL ADVICE (OUTPATIENT)
Dept: FAMILY MEDICINE | Facility: CLINIC | Age: 39
End: 2019-11-21

## 2019-12-02 ASSESSMENT — PATIENT HEALTH QUESTIONNAIRE - PHQ9
10. IF YOU CHECKED OFF ANY PROBLEMS, HOW DIFFICULT HAVE THESE PROBLEMS MADE IT FOR YOU TO DO YOUR WORK, TAKE CARE OF THINGS AT HOME, OR GET ALONG WITH OTHER PEOPLE: NOT DIFFICULT AT ALL
SUM OF ALL RESPONSES TO PHQ QUESTIONS 1-9: 5
SUM OF ALL RESPONSES TO PHQ QUESTIONS 1-9: 5

## 2019-12-02 NOTE — TELEPHONE ENCOUNTER
Called pt and asked him to do phq9 over the phone, but he was busy and wants to do it through [facility]. I told him we would be on the look out for it.

## 2019-12-03 ASSESSMENT — PATIENT HEALTH QUESTIONNAIRE - PHQ9: SUM OF ALL RESPONSES TO PHQ QUESTIONS 1-9: 5

## 2019-12-03 NOTE — TELEPHONE ENCOUNTER
PHQ-9 score:    PHQ-9 SCORE 12/2/2019   PHQ-9 Total Score MyChart 5 (Mild depression)   PHQ-9 Total Score 5       Routing updated PHQ-9 score to provider as FYI

## 2020-03-01 ENCOUNTER — OFFICE VISIT (OUTPATIENT)
Dept: URGENT CARE | Facility: URGENT CARE | Age: 40
End: 2020-03-01
Payer: COMMERCIAL

## 2020-03-01 VITALS
DIASTOLIC BLOOD PRESSURE: 73 MMHG | TEMPERATURE: 99.8 F | HEART RATE: 81 BPM | OXYGEN SATURATION: 96 % | RESPIRATION RATE: 16 BRPM | SYSTOLIC BLOOD PRESSURE: 114 MMHG

## 2020-03-01 DIAGNOSIS — H66.001 NON-RECURRENT ACUTE SUPPURATIVE OTITIS MEDIA OF RIGHT EAR WITHOUT SPONTANEOUS RUPTURE OF TYMPANIC MEMBRANE: ICD-10-CM

## 2020-03-01 DIAGNOSIS — H65.03 NON-RECURRENT ACUTE SEROUS OTITIS MEDIA OF BOTH EARS: ICD-10-CM

## 2020-03-01 DIAGNOSIS — J10.1 INFLUENZA DUE TO INFLUENZA VIRUS, TYPE A, HUMAN: ICD-10-CM

## 2020-03-01 DIAGNOSIS — R68.89 FLU-LIKE SYMPTOMS: Primary | ICD-10-CM

## 2020-03-01 LAB
FLUAV+FLUBV AG SPEC QL: NEGATIVE
FLUAV+FLUBV AG SPEC QL: POSITIVE
SPECIMEN SOURCE: ABNORMAL

## 2020-03-01 PROCEDURE — 99214 OFFICE O/P EST MOD 30 MIN: CPT | Performed by: FAMILY MEDICINE

## 2020-03-01 PROCEDURE — 87804 INFLUENZA ASSAY W/OPTIC: CPT | Performed by: FAMILY MEDICINE

## 2020-03-01 RX ORDER — OSELTAMIVIR PHOSPHATE 75 MG/1
75 CAPSULE ORAL 2 TIMES DAILY
Qty: 10 CAPSULE | Refills: 0 | Status: SHIPPED | OUTPATIENT
Start: 2020-03-01 | End: 2020-05-13

## 2020-03-01 RX ORDER — CEFDINIR 300 MG/1
300 CAPSULE ORAL 2 TIMES DAILY
Qty: 20 CAPSULE | Refills: 0 | Status: SHIPPED | OUTPATIENT
Start: 2020-03-01 | End: 2020-05-13

## 2020-03-01 RX ORDER — FLUTICASONE PROPIONATE 50 MCG
1 SPRAY, SUSPENSION (ML) NASAL 2 TIMES DAILY
Qty: 16 G | Refills: 0 | Status: SHIPPED | OUTPATIENT
Start: 2020-03-01 | End: 2021-09-16

## 2020-03-01 NOTE — PATIENT INSTRUCTIONS
Start tamiflu    Start cefdinir for r ear infection     Continue flonase to open up your ears     Use sudafed from behind the counter at pharmacy to help with fluid behind the ear drums-will keep you awake    Use motrin 800 mg 3 times a day with food for aches and fever     Try benadryl at bedtime for congestion and sleep      Flu (Influenza)   What is influenza?   Influenza (also called flu) is a viral infection of the nose, throat, trachea, and bronchi (air passages). Outbreaks of flu occur almost every year, usually in late fall and winter.   Flu viruses cause more severe symptoms and can cause more severe medical problems than cold viruses. Older adults, people whose immune systems are impaired, and people with chronic medical problems are particularly at risk for more severe flu symptoms or complications.   How does it occur?   The flu virus is almost always spread from person to person by droplets that are coughed or sneezed into the air. It can also be spread by the hands of an infected person who has touched their mouth or nose.   What are the symptoms?   Influenza tends to start suddenly. You may feel fine one hour and have a high fever the next.   The usual first symptoms are:   chills and fever (often 101 to 103 F, or 38 to 40 C)   sweating   muscle aches   headache.   Symptoms soon to follow may include:   runny nose and nasal congestion   cough   sore throat   eyes sensitive to light.   How is it diagnosed?   Influenza can usually be diagnosed from your symptoms. Your health care provider may examine you to rule out other types of infection, such as strep throat and sinusitis.   How is it treated?   Usually you will recognize the symptoms and can manage them at home. It is a good idea to speak to your health care provider if you have symptoms of the flu and:   You have heart disease, asthma, chronic bronchitis, kidney disease, diabetes, or another chronic medical problem.   Your immune system does not  work normally (for example, because you are taking steroids for another medical problem).   Your symptoms become more severe, or you have a painful cough and are coughing up phlegm. This may indicate you have pneumonia or bronchitis.   To take care of yourself at home:   Get plenty of rest.   Drink a lot of liquids. Water, juice, and noncaffeinated drinks are best. Especially when you have a high fever, your body needs much more liquid than when you are healthy. Having enough fluids also helps the mucus in your sinuses and lungs to stay thin and easy to clear from the body. When the mucus is thin, it is less likely to cause a sinus infection or bronchitis.   Consider taking acetaminophen or ibuprofen to relieve headaches and muscle aches and to lower a fever. (Do NOT use aspirin if you have the flu.) Some health care providers feel that because fever is part of the immune system's reaction to infection, it is better to let a fever run its course than to try to lower it. Letting the fever run its course, however, can be dangerous in children and older adults. Also, most healthy adults feel much better if the fever is decreased even just 1 or 2 degrees.   If your nose or sinuses become congested, a decongestant medicine may help you feel better and may possibly help prevent ear or sinus infections.   Take cough medicine to help control your cough.   Antihistamine medicine can be helpful if a runny nose is making it hard for you to sleep. However, antihistamine has a very drying effect and may cause the mucus in your nose, throat, and lungs to become thick and dry.   There are medicines your health care provider can prescribe that can make flu symptoms less severe. They may also help the symptoms not last as long. Examples of these drugs are amantadine (Symadine or Symmetrel), rimantadine (Flumadine), zanamivir (Relenza), and oseltamivir (Tamiflu). These flu medicines are available as tablets or nasal sprays. They must  be started within the first 48 hours of illness to be effective. Usually they need to be taken only a few days. A common side effect of the tablets is lightheadedness or dizziness.   How long will the effects last?   Flu symptoms usually last 3 to 7 days. They often start improving gradually after the first 2 days or so.   Infection with the flu virus often leads to other infections, such as ear, sinus, and bronchial infections. Pneumonia can also occur as a result of the flu. It can be caused by the flu virus itself or by bacteria invading lung tissues that have been damaged by the virus. Pneumonia is a common cause of death in people over the age of 65 and often occurs during and after flu outbreaks.   An unusual complication of flu is Reye's syndrome, which usually occurs in children and adolescents and rarely occurs in adults. Reye's syndrome is not well understood but it involves failure of the liver and brain swelling, which together can lead to coma and sometimes death. A link has been shown between the use of aspirin during influenza illness and the development of Reye's syndrome. For this reason it is best to avoid taking aspirin when you have the flu.   What can I do to prevent influenza?   Flu shots are usually about 70% effective in preventing flu. Because the flu virus strain varies from year to year, you need to get a new flu shot each year. October is the best time to get vaccinated, but you can still get vaccinated in November and later. Flu season can begin as early as October and last as late as May. Flu seasons can vary from region to region. If you are at high risk for infection and plan to travel to an area where you might be exposed to the flu, make sure you have an up-to-date flu shot before you go on your trip.   A new alternative to flu shots is FluMist. It is a nasal spray form of the vaccine for healthy adults under 50 years of age. It costs more than the shot. As with flu shots, you will  need a new dose of FluMist every year. Pregnant women cannot take the nasal spray. Also, people with certain other medical conditions should not take FluMist. If you are considering using FluMist, ask your provider if it is recommended for you.   If a flu outbreak has begun and you have not had the flu vaccine and need some protection, your health care provider may prescribe medicine that can decrease your chances of getting the flu during the outbreak. You will need to take these medicines for at least 2 weeks after you are vaccinated. If you don't get the vaccine, you need to take the medicine until the flu outbreak has left your community, which may be several weeks. If you do get the flu, the medicines can make your symptoms less severe.   The simplest, oldest method of avoiding spread of infection is frequent hand washing, preferably with antibacterial soap from a sanitary dispenser. It is also a good practice not to eat in or near your workplace. Your hands or food might be contaminated with the virus particles from co-workers, customers, or schoolchildren, depending on your place of work.   Copyright   2006 [employer] and/or one of its subsidiaries. All Rights Reserved.        Patient Education     Oseltamivir capsules  Brand Name: Tamiflu  What is this medicine?  OSELTAMIVIR (os el PIÑA i vir) is an antiviral medicine. It is used to prevent and to treat some kinds of influenza or the flu. It will not work for colds or other viral infections.  How should I use this medicine?  Take this medicine by mouth with a glass of water. Follow the directions on the prescription label. Start this medicine at the first sign of flu symptoms. You can take it with or without food. If it upsets your stomach, take it with food. Take your medicine at regular intervals. Do not take your medicine more often than directed. Take all of your medicine as directed even if you think you are better. Do not skip doses or stop  your medicine early.  Talk to your pediatrician regarding the use of this medicine in children. While this drug may be prescribed for children as young as 14 days for selected conditions, precautions do apply.  What side effects may I notice from receiving this medicine?  Side effects that you should report to your doctor or health care professional as soon as possible:    allergic reactions like skin rash, itching or hives, swelling of the face, lips, or tongue    anxiety, confusion, unusual behavior    breathing problems    hallucination, loss of contact with reality    redness, blistering, peeling or loosening of the skin, including inside the mouth    seizures  Side effects that usually do not require medical attention (report to your doctor or health care professional if they continue or are bothersome):    diarrhea    headache    nausea, vomiting    pain  What may interact with this medicine?      intranasal influenza vaccine  What if I miss a dose?  If you miss a dose, take it as soon as you remember. If it is almost time for your next dose (within 2 hours), take only that dose. Do not take double or extra doses.  Where should I keep my medicine?  Keep out of the reach of children.  Store at room temperature between 15 and 30 degrees C (59 and 86 degrees F). Throw away any unused medicine after the expiration date.  What should I tell my health care provider before I take this medicine?  They need to know if you have any of the following conditions:    heart disease    immune system problems    kidney disease    liver disease    lung disease    an unusual or allergic reaction to oseltamivir, other medicines, foods, dyes, or preservatives    pregnant or trying to get pregnant    breast-feeding  What should I watch for while using this medicine?  Visit your doctor or health care professional for regular check ups. Tell your doctor if your symptoms do not start to get better or if they get worse.  If you have  the flu, you may be at an increased risk of developing seizures, confusion, or abnormal behavior. This occurs early in the illness, and more frequently in children and teens. These events are not common, but may result in accidental injury to the patient. Families and caregivers of patients should watch for signs of unusual behavior and contact a doctor or health care professional right away if the patient shows signs of unusual behavior.  This medicine is not a substitute for the flu shot. Talk to your doctor each year about an annual flu shot.  NOTE:This sheet is a summary. It may not cover all possible information. If you have questions about this medicine, talk to your doctor, pharmacist, or health care provider. Copyright  2019 [employer]             Patient Education     Earache, No Infection (Adult)(serous otitis media)  Earaches can happen without an infection. This occurs when air and fluid build up behind the eardrum causing a feeling of fullness and discomfort and reduced hearing. This is called otitis media with effusion (OME) or serous otitis media. It means there is fluid in the middle ear. It is not the same as acute otitis media, which is typically from infection.  OME can happen when you have a cold if congestion blocks the passage that drains the middle ear. This passage is called the eustachian tube. OME may also occur with nasal allergies or after a bacterial middle ear infection.    The pain or discomfort may come and go. You may hear clicking or popping sounds when you chew or swallow. You may feel that your balance is off. Or you may hear ringing in the ear.  It often takes from several weeks up to 3 months for the fluid to clear on its own. Oral pain relievers and ear drops help if there is pain. Decongestants and antihistamines sometimes help. Antibiotics don't help since there is no infection. Your doctor may prescribe a nasal spray to help reduce swelling in the nose and eustachian tube.  This can allow the ear to drain.  If your OME doesn't improve after 3 months, surgery may be used to drain the fluid and insert a small tube in the eardrum to allow continued drainage.  Because the middle ear fluid can become infected, it is important to watch for signs of an ear infection which may develop later. These signs include increased ear pain, fever, or drainage from the ear.  Home care  The following guidelines will help you care for yourself at home:    You may use over-the-counter medicine as directed to control pain, unless another medicine was prescribed. If you have chronic liver or kidney disease or ever had a stomach ulcer or GI bleeding, talk with your doctor before using these medicines. Aspirin should never be used in anyone under 18 years of age who is ill with a fever. It may cause severe liver damage.    You may use over-the-counter decongestants such as phenylephrine or pseudoephedrine. But they are not always helpful. Don't use nasal spray decongestants more than 3 days. Longer use can make congestion worse. Prescription nasal sprays from your doctor don't typically have those restrictions.    Antihistamines may help if you are also having allergy symptoms.    You may use medicines such as guaifenesin to thin mucus and promote drainage.  Follow-up care  Follow up with your healthcare provider or as advised if you are not feeling better after 3 days.  When to seek medical advice  Call your healthcare provider right away if any of the following occur:    Your ear pain gets worse or does not start to improve     Fever of 100.4 F (38 C) or higher, or as directed by your healthcare provider    Fluid or blood draining from the ear    Headache or sinus pain    Stiff neck    Unusual drowsiness or confusion  Date Last Reviewed: 10/1/2016    4929-4641 [employer]. [address] [place] [address], [place] [address]. All rights reserved. This information is not intended as a substitute for  professional medical care. Always follow your healthcare professional's instructions.

## 2020-03-01 NOTE — PROGRESS NOTES
Patient presents with:  Urgent Care: throat pain, nasal congestion, chest congestion, cough and headache since friday morning.       Subjective     Kathleen Clark is a 39 year old male who presents to clinic today for the following health issues:    HPI   RESPIRATORY SYMPTOMS      Duration: 2 days     Description  nasal congestion, rhinorrhea, sore throat, cough, fever, chills, ear fullness bilateral, headache, fatigue/malaise and myalgias, no nausea/diarrhea/ vomiting     Severity: moderate    Accompanying signs and symptoms: as noted     History (predisposing factors):  Ear fullness bilateral ears, using flonase for last 2 weeks     Precipitating or alleviating factors: None    Therapies tried and outcome:  Dayquil this am         Patient Active Problem List   Diagnosis     Pancreatic cyst     Chronic recurrent pancreatitis (H) s/po resection of head of pancreas w cyst & Whipple in 2015     Seasonal allergic rhinitis, unspecified trigger     Overweight (BMI 25.0-29.9)     Past Surgical History:   Procedure Laterality Date     ABDOMEN SURGERY  2015    Whipple surgery     HERNIA REPAIR  2015    ventral hernia after the Whipple surgery       Social History     Tobacco Use     Smoking status: Never Smoker     Smokeless tobacco: Never Used   Substance Use Topics     Alcohol use: Yes     Family History   Problem Relation Age of Onset     No Known Problems Mother      No Known Problems Father      No Known Problems Maternal Grandmother      Emphysema Maternal Grandfather      Asthma Maternal Grandfather      No Known Problems Paternal Grandmother      Prostate Cancer Paternal Grandfather      No Known Problems Brother      No Known Problems Sister      No Known Problems Daughter      No Known Problems Brother      No Known Problems Daughter          Current Outpatient Medications   Medication Sig Dispense Refill     FLUoxetine (PROZAC) 20 MG capsule Take 1 capsule (20 mg) by mouth daily 90 capsule 1      fluticasone (FLONASE) 50 MCG/ACT nasal spray Spray 1 spray into both nostrils 2 times daily 16 g 0     Omega-3 Fatty Acids (FISH OIL PO)        No Known Allergies  Recent Labs   Lab Test 08/31/18  0800   ALT 53   CR 1.06   GFRESTIMATED 79   GFRESTBLACK >90   POTASSIUM 4.1      BP Readings from Last 3 Encounters:   03/01/20 114/73   06/28/19 120/80   05/28/19 110/76    Wt Readings from Last 3 Encounters:   06/28/19 92.1 kg (203 lb)   05/28/19 92.1 kg (203 lb)   09/11/18 101.6 kg (224 lb)                      Reviewed and updated as needed this visit by Provider         Review of Systems   ROS COMP: Constitutional, HEENT, cardiovascular, pulmonary, gi and gu systems are negative, except as otherwise noted.      Objective    /73   Pulse 81   Temp 99.8  F (37.7  C) (Tympanic)   Resp 16   SpO2 96%   There is no height or weight on file to calculate BMI.    /73   Pulse 81   Temp 99.8  F (37.7  C) (Tympanic)   Resp 16   SpO2 96%    GENERAL: Pleasant and interactive.  Alert and oriented x 3.  No acute distress.   HEENT: Eyes clear.  Nose with mild clear/white mucous. Posterior pharynx moderately injected   Effected ear(s) show(s) opacity and erythema of the TM on right, dull on left   NECK: supple and free of adenopathy or masses, the thyroid is normal without enlargement or nodules.   CHEST:  clear, no wheezing or rales. Normal symmetric air entry throughout both lung fields. No chest wall deformities or tenderness.   CV: regular rate and rhythm without murmurs, gallops or rubs   SKIN:  Only benign skin findings. No unusual rashes or suspicious skin lesions noted.        Diagnostic Test Results:  Labs reviewed in Epic  Results for orders placed or performed in visit on 03/01/20   Influenza A/B antigen     Status: Abnormal   Result Value Ref Range    Influenza A/B Agn Specimen Nasal     Influenza A Positive (A) NEG^Negative    Influenza B Negative NEG^Negative           Assessment & Plan       ICD-10-CM     1. Flu-like symptoms  R68.89 Influenza A/B antigen   2. Influenza due to influenza virus, type A, human  J10.1 oseltamivir (TAMIFLU) 75 MG capsule   3. Non-recurrent acute suppurative otitis media of right ear without spontaneous rupture of tympanic membrane  H66.001 cefdinir (OMNICEF) 300 MG capsule   4. Non-recurrent acute serous otitis media of both ears  H65.03 fluticasone (FLONASE) 50 MCG/ACT nasal spray     Patient instructions    Start tamiflu    Start cefdinir for r ear infection     Continue flonase to open up your ears     Use sudafed from behind the counter at pharmacy to help with fluid behind the ear drums-will keep you awake    Use motrin 800 mg 3 times a day with food for aches and fever     Try benadryl at bedtime for congestion and sleep        Emmanuelle Pfeiffer MD  [facility]    Reviewed medication instructions and side effects. Follow up if experiences side effects.     I reviewed supportive care, otc meds to use if needed, expected course, and signs of concern.  Follow up as needed or if he does not improve within 1 -2 day(s) or if worsens in any way.  Reviewed red flag symptoms and is to go to the ER if experiences any of these.

## 2020-03-11 ENCOUNTER — HEALTH MAINTENANCE LETTER (OUTPATIENT)
Age: 40
End: 2020-03-11

## 2020-05-04 ENCOUNTER — TELEPHONE (OUTPATIENT)
Dept: FAMILY MEDICINE | Facility: CLINIC | Age: 40
End: 2020-05-04

## 2020-05-04 NOTE — TELEPHONE ENCOUNTER
Panel Management Review      Patient has the following on his problem list:     Depression / Dysthymia review    Measure:  Needs PHQ-9 score of 4 or less during index window.  Administer PHQ-9 and if score is 5 or more, send encounter to provider for next steps.    5 - 7 month window range:     PHQ-9 SCORE 6/28/2019 8/26/2019 12/2/2019   PHQ-9 Total Score MyChart 13 (Moderate depression) 4 (Minimal depression) 5 (Mild depression)   PHQ-9 Total Score 13 4 5       If PHQ-9 recheck is 5 or more, route to provider for next steps.    Patient is due for:  PHQ9      Composite cancer screening  Chart review shows that this patient is due/due soon for the following None  Summary:    Patient is due/failing the following:   PHQ9    Action needed:   Patient needs to do PHQ9.    Type of outreach:    Sent [facility] message.    Questions for provider review:    None                                                                                   Elva Duran CMA

## 2020-05-07 ENCOUNTER — TELEPHONE (OUTPATIENT)
Dept: FAMILY MEDICINE | Facility: CLINIC | Age: 40
End: 2020-05-07

## 2020-05-07 ENCOUNTER — VIRTUAL VISIT (OUTPATIENT)
Dept: FAMILY MEDICINE | Facility: OTHER | Age: 40
End: 2020-05-07

## 2020-05-07 NOTE — PROGRESS NOTES
"Date: 2020 13:26:48  Clinician: Paul Thomas  Clinician NPI: [id number]  Patient: NAIMA Clark  Patient : 1980  Patient Address: [address] [place] [address]  Patient Phone: [phone]  Visit Protocol: Ear pain  Patient Summary:  NAIMA is a 39 year old ( : 1980 ) male who initiated a Visit for swimmer's ear (ear pain). When asked the question \"Please sign me up to receive news, health information and promotions from [employer].\", NAIMA responded \"Yes\".    NAIMA uploaded images of his ear(s).   NAIMA reports that his ear pain started 5-7 days ago. The ear pain is located inside the left ear.   In addition to the ear pain, NAIMA is experiencing tenderness, redness, and a feeling of fullness in the ear(s). His ear(s) is tender to the touch on the mastoid process, ear canal, and tragus.   Symptom Details   Pain: NAIMA is experiencing moderate pain (4-6 on a 10 point pain scale). It gets worse when he eats or chews. It does not get worse when he gently pulls on the earlobe(s).    NAIMA denies itchiness in the ear(s). He also denies recent injuries near the ear(s), having fluid draining from the ear(s), feeling feverish, ever having ear tubes, and the possibility of a foreign object in the ear(s).   Precipitating events   NAIMA denies swimming and flying within the past week.   Pertinent medical history   Weight: 220 lbs   He does not smoke or use smokeless tobacco.   Weight: 220 lbs    MEDICATIONS: No current medications, ALLERGIES: NKDA  Clinician Response:  Dear NAIMA,   Based upon the information you provided, you may have otitis externa. External otitis, also known as swimmer's ear, is a condition that occurs when the ear canal becomes irritated or infected.   Medication information  I am prescribing:     Neomycin-polymyxin-hydrocortisone 1% otic ear drops. Instill 4 drops into affected ear(s) 3 times per day for 7 days. There are no refills with this " prescription.   Instructions for using eardrops:     Lie on your side or tilt your head    Insert the drops into your ear canal    Lie on your side or insert a cotton ball in the ear canal for 5 minutes    Use the medication for the number of days recommended, even if you begin to feel better within a few days after starting the drops     Self care  Avoid getting water inside your ear while you are being treated for swimmer's ear.  Use a cotton ball coated with petroleum jelly to protect your ears when bathing and showering.  Do not go swimming or diving for the next 7-10 days.  Do not wear headphones or hearing aid in the infected ears until your symptoms improve.  When to seek care  Please make an appointment to be seen in a clinic or urgent care if any of the following occur:     Symptoms do not improve within 2 days    New symptoms develop or symptoms becomes worse      Diagnosis: Otitis externa  Diagnosis ICD: H60.399  Prescription: neomycin-polymyxin-HC 3.5-10,000-1 mg/mL-unit/mL-% otic (ear) drops,suspension 1 10 ml dropper bottle, 7 days supply. Instill 4 drops into affected ear(s) 3 times per day for 7 days. Refills: 0, Refill as needed: no, Allow substitutions: yes  Pharmacy: [id number] - [phone] - [phone] [place] [address]

## 2020-05-07 NOTE — TELEPHONE ENCOUNTER
Reason for call:  Patient reporting a symptom    Symptom or request: ear pain    Duration (how long have symptoms been present): 4-5 days (prior too)    Have you been treated for this before? Yes    Additional comments: the patient [place] [facility] [place], he has not felt well since then to now regarding his ear's.  He doesn't want to come into the clinic and wants to know what he should do regarding his symptoms    Phone Number patient can be reached at:  Home number on file [phone] (home)    Best Time:  anytime    Can we leave a detailed message on this number:  YES    Call taken on 5/7/2020 at 1:26 PM by Monique Childs

## 2020-05-07 NOTE — TELEPHONE ENCOUNTER
Pt went to [facility] while waiting for a call.  States he got abx eardrops.    Will follow up if not feeling better.    No further action needed at this time.

## 2020-05-13 ENCOUNTER — VIRTUAL VISIT (OUTPATIENT)
Dept: FAMILY MEDICINE | Facility: CLINIC | Age: 40
End: 2020-05-13
Payer: COMMERCIAL

## 2020-05-13 DIAGNOSIS — H66.003 NON-RECURRENT ACUTE SUPPURATIVE OTITIS MEDIA OF BOTH EARS WITHOUT SPONTANEOUS RUPTURE OF TYMPANIC MEMBRANES: Primary | ICD-10-CM

## 2020-05-13 PROCEDURE — 99213 OFFICE O/P EST LOW 20 MIN: CPT | Mod: 95 | Performed by: FAMILY MEDICINE

## 2020-05-13 NOTE — PROGRESS NOTES
"Kathleen Clark is a 39 year old male who is being evaluated via a billable telephone visit.      The patient has been notified of following:     \"This telephone visit will be conducted via a call between you and your physician/provider. We have found that certain health care needs can be provided without the need for a physical exam.  This service lets us provide the care you need with a short phone conversation.  If a prescription is necessary we can send it directly to your pharmacy.  If lab work is needed we can place an order for that and you can then stop by our lab to have the test done at a later time.    Telephone visits are billed at different rates depending on your insurance coverage. During this emergency period, for some insurers they may be billed the same as an in-person visit.  Please reach out to your insurance provider with any questions.    If during the course of the call the physician/provider feels a telephone visit is not appropriate, you will not be charged for this service.\"    Patient has given verbal consent for Telephone visit?  Yes    What phone number would you like to be contacted at? [phone]    How would you like to obtain your AVS? Pat Delacruz     Kathleen Clark is a 39 year old male who presents to clinic today for the following health issues:    HPI  Ear/jaw      Duration: 1 1/2 weeks    Description (location/character/radiation): ear, jaw    Intensity:  moderate    Accompanying signs and symptoms: off balance, muffled sound    History (similar episodes/previous evaluation): None    Precipitating or alleviating factors: ear drops for swimmers ear    Therapies tried and outcome: None   See note from visit in urgent care on 3/1/20.  TM's then looked inflamed               No Known Allergies    Reviewed and updated as needed this visit by Provider         Review of Systems   CONSTITUTIONAL: NEGATIVE for fever, chills, change in weight  ENT/MOUTH: POSITIVE for " earache bilateral, nasal congestion and postnasal drainage  RESP: NEGATIVE for significant cough or SOB  CV: NEGATIVE for chest pain, palpitations or peripheral edema  PSYCHIATRIC: NEGATIVE for changes in mood or affect and POSITIVE forHx depression       Objective   Reported vitals:  There were no vitals taken for this visit.   healthy, alert and no distress  PSYCH: Alert and oriented times 3; coherent speech, normal   rate and volume, able to articulate logical thoughts, able   to abstract reason, no tangential thoughts, no hallucinations   or delusions  His affect is normal and pleasant  RESP: No cough, no audible wheezing, able to talk in full sentences  Remainder of exam unable to be completed due to telephone visits    Diagnostic Test Results:  Labs reviewed in Epic  none         Assessment/Plan:  1. Non-recurrent acute suppurative otitis media of both ears without spontaneous rupture of tympanic membranes  Sounds most consistent with OM  - amoxicillin-clavulanate (AUGMENTIN) 875-125 MG tablet; Take 1 tablet by mouth 2 times daily  Dispense: 20 tablet; Refill: 0    Return in about 2 weeks (around 5/27/2020), or if symptoms worsen or fail to improve, for ear recheck.     If not improving will need to be seen in clinic      Phone call duration:  12 minutes    Chencho Morales MD

## 2020-05-13 NOTE — PATIENT INSTRUCTIONS
Symptomatic treatment.  Will use saline gargles, tylenol and/or advil. Suck on  lozenges as needed. Push fluids. Salt water nasal spray as needed.  Work to equalize eustachian pressures in both ears

## 2020-09-17 ENCOUNTER — OFFICE VISIT (OUTPATIENT)
Dept: FAMILY MEDICINE | Facility: CLINIC | Age: 40
End: 2020-09-17
Payer: COMMERCIAL

## 2020-09-17 VITALS
HEIGHT: 73 IN | HEART RATE: 68 BPM | SYSTOLIC BLOOD PRESSURE: 122 MMHG | BODY MASS INDEX: 29.69 KG/M2 | WEIGHT: 224 LBS | OXYGEN SATURATION: 97 % | DIASTOLIC BLOOD PRESSURE: 82 MMHG | TEMPERATURE: 97 F | RESPIRATION RATE: 18 BRPM

## 2020-09-17 DIAGNOSIS — Z23 ENCOUNTER FOR IMMUNIZATION: ICD-10-CM

## 2020-09-17 DIAGNOSIS — Z00.00 ROUTINE GENERAL MEDICAL EXAMINATION AT A HEALTH CARE FACILITY: Primary | ICD-10-CM

## 2020-09-17 DIAGNOSIS — K21.9 GASTROESOPHAGEAL REFLUX DISEASE WITHOUT ESOPHAGITIS: ICD-10-CM

## 2020-09-17 DIAGNOSIS — K86.1 CHRONIC RECURRENT PANCREATITIS (H): ICD-10-CM

## 2020-09-17 DIAGNOSIS — F41.1 GAD (GENERALIZED ANXIETY DISORDER): ICD-10-CM

## 2020-09-17 DIAGNOSIS — F33.1 MODERATE EPISODE OF RECURRENT MAJOR DEPRESSIVE DISORDER (H): ICD-10-CM

## 2020-09-17 LAB
ERYTHROCYTE [DISTWIDTH] IN BLOOD BY AUTOMATED COUNT: 12.1 % (ref 10–15)
HCT VFR BLD AUTO: 45.2 % (ref 40–53)
HGB BLD-MCNC: 15.2 G/DL (ref 13.3–17.7)
MCH RBC QN AUTO: 30.7 PG (ref 26.5–33)
MCHC RBC AUTO-ENTMCNC: 33.6 G/DL (ref 31.5–36.5)
MCV RBC AUTO: 91 FL (ref 78–100)
PLATELET # BLD AUTO: 177 10E9/L (ref 150–450)
RBC # BLD AUTO: 4.95 10E12/L (ref 4.4–5.9)
WBC # BLD AUTO: 5.7 10E9/L (ref 4–11)

## 2020-09-17 PROCEDURE — 99213 OFFICE O/P EST LOW 20 MIN: CPT | Mod: 25 | Performed by: FAMILY MEDICINE

## 2020-09-17 PROCEDURE — 82150 ASSAY OF AMYLASE: CPT | Performed by: FAMILY MEDICINE

## 2020-09-17 PROCEDURE — 90472 IMMUNIZATION ADMIN EACH ADD: CPT | Performed by: FAMILY MEDICINE

## 2020-09-17 PROCEDURE — 80061 LIPID PANEL: CPT | Performed by: FAMILY MEDICINE

## 2020-09-17 PROCEDURE — 80053 COMPREHEN METABOLIC PANEL: CPT | Performed by: FAMILY MEDICINE

## 2020-09-17 PROCEDURE — 85027 COMPLETE CBC AUTOMATED: CPT | Performed by: FAMILY MEDICINE

## 2020-09-17 PROCEDURE — 90686 IIV4 VACC NO PRSV 0.5 ML IM: CPT | Performed by: FAMILY MEDICINE

## 2020-09-17 PROCEDURE — 36415 COLL VENOUS BLD VENIPUNCTURE: CPT | Performed by: FAMILY MEDICINE

## 2020-09-17 PROCEDURE — 90471 IMMUNIZATION ADMIN: CPT | Performed by: FAMILY MEDICINE

## 2020-09-17 PROCEDURE — 83690 ASSAY OF LIPASE: CPT | Performed by: FAMILY MEDICINE

## 2020-09-17 PROCEDURE — 99395 PREV VISIT EST AGE 18-39: CPT | Mod: 25 | Performed by: FAMILY MEDICINE

## 2020-09-17 PROCEDURE — 90715 TDAP VACCINE 7 YRS/> IM: CPT | Performed by: FAMILY MEDICINE

## 2020-09-17 RX ORDER — OMEPRAZOLE 40 MG/1
40 CAPSULE, DELAYED RELEASE ORAL DAILY
Qty: 30 CAPSULE | Refills: 1 | Status: SHIPPED | OUTPATIENT
Start: 2020-09-17 | End: 2021-09-16

## 2020-09-17 ASSESSMENT — MIFFLIN-ST. JEOR: SCORE: 1989.94

## 2020-09-17 NOTE — PROGRESS NOTES
SUBJECTIVE:   CC: Kathleen Clark is an 40 year old male who presents for preventative health visit.     Patient has been advised of split billing requirements and indicates understanding: Yes  Healthy Habits:    Getting at least 3 servings of Calcium per day:  Yes    Bi-annual eye exam:  Yes    Dental care twice a year:  Yes    Sleep apnea or symptoms of sleep apnea:  None    Diet:  Regular (no restrictions)    Frequency of exercise:  6-7 days/week    Duration of exercise:  45-60 minutes    Taking medications regularly:  Not Applicable    Barriers to taking medications:  Not applicable    Medication side effects:  Not applicable    PHQ-2 Total Score:    Additional concerns today:  Yes    Pt would like to go back on fluoxetine, has some moles on back he would like looked at.    Pt also having upper abdominal discomfort, pt has been taking omeprazole 20 mg but has not been helpful, worse when laying down.    Pt also inquiring about starting metformin since he has previous hx of pancreatitis.           GERD/Heartburn      Duration: past 2 mo     Description (location/character/radiation): aching in upper abdomen, reflux    Intensity:  mild    Accompanying signs and symptoms:  food getting stuck: no   nausea/vomiting/blood: no   abdominal pain: YES- slight senstivity  black/tarry or bloody stools: no :    History (similar episodes/previous evaluation): Hx of pancreatitis, Hx of Whipple surgery in 2015    Precipitating or alleviating factors:  worse with no particular food or drink.  current NSAID/Aspirin use: no     Therapies tried and outcome: Omeprazole (Prilosec)      Today's PHQ-2 Score:   PHQ-2 ( 1999 Pfizer) 5/28/2019   Q1: Little interest or pleasure in doing things 0   Q2: Feeling down, depressed or hopeless 0   PHQ-2 Score 0       Abuse: Current or Past(Physical, Sexual or Emotional)- No  Do you feel safe in your environment? Yes        Social History     Tobacco Use     Smoking status: Never Smoker      "Smokeless tobacco: Never Used   Substance Use Topics     Alcohol use: Yes     If you drink alcohol do you typically have >3 drinks per day or >7 drinks per week? No    No flowsheet data found.No flowsheet data found.    Last PSA: No results found for: PSA    Reviewed orders with patient. Reviewed health maintenance and updated orders accordingly - Yes  Lab work is in process  Labs reviewed in EPIC    Reviewed and updated as needed this visit by clinical staff  Tobacco  Allergies  Meds  Med Hx  Surg Hx  Fam Hx  Soc Hx        Reviewed and updated as needed this visit by Provider            Review of Systems  CONSTITUTIONAL: NEGATIVE for fever, chills, change in weight  INTEGUMENTARY/SKIN: NEGATIVE for worrisome rashes, moles or lesions  EYES: NEGATIVE for vision changes or irritation  ENT: NEGATIVE for ear, mouth and throat problems  RESP: NEGATIVE for significant cough or SOB  CV: NEGATIVE for chest pain, palpitations or peripheral edema  GI: POSITIVE for heartburn or reflux   male: negative for dysuria, hematuria, decreased urinary stream, erectile dysfunction, urethral discharge  MUSCULOSKELETAL: NEGATIVE for significant arthralgias or myalgia  NEURO: NEGATIVE for weakness, dizziness or paresthesias  PSYCHIATRIC: POSITIVE fordepressed mood and Hx depression    OBJECTIVE:   /82 (Patient Position: Sitting, Cuff Size: Adult Large)   Pulse 68   Temp 97  F (36.1  C) (Tympanic)   Resp 18   Ht 1.854 m (6' 1\")   Wt 101.6 kg (224 lb)   SpO2 97%   BMI 29.55 kg/m      Physical Exam  GENERAL: healthy, alert and no distress  EYES: Eyes grossly normal to inspection, PERRL and conjunctivae and sclerae normal  HENT: ear canals and TM's normal, nose and mouth without ulcers or lesions  NECK: no adenopathy, no asymmetry, masses, or scars and thyroid normal to palpation  RESP: lungs clear to auscultation - no rales, rhonchi or wheezes  CV: regular rate and rhythm, normal S1 S2, no S3 or S4, no murmur, click or " rub, no peripheral edema and peripheral pulses strong  ABDOMEN: soft, nontender, without hepatosplenomegaly or masses, bowel sounds normal and well-healed incision midline   (male): testicles normal without atrophy or masses, no hernias and penis normal without urethral discharge  MS: no gross musculoskeletal defects noted, no edema  SKIN: no suspicious lesions or rashes  NEURO: Normal strength and tone, mentation intact and speech normal  PSYCH: mentation appears normal, affect normal/bright    Diagnostic Test Results:  Labs reviewed in [facility]  Lab: see below, results pending    ASSESSMENT/PLAN:   Kathleen was seen today for physical.    Diagnoses and all orders for this visit:    Routine general medical examination at a health care facility  -     CBC with platelets  -     Lipid panel reflex to direct LDL Fasting    Chronic recurrent pancreatitis (H) s/po resection of head of pancreas w cyst & Whipple in 2015  -     Comprehensive metabolic panel  -     Amylase  -     Lipase    Encounter for immunization  -     INFLUENZA VACCINE IM > 6 MONTHS VALENT IIV4 [00856]  -     TDAP VACCINE  -     VACCINE ADMINISTRATION, INITIAL  -     VACCINE ADMINISTRATION, EACH ADDITIONAL    Gastroesophageal reflux disease without esophagitis  -     omeprazole (PRILOSEC) 40 MG DR capsule; Take 1 capsule (40 mg) by mouth daily    Moderate episode of recurrent major depressive disorder (H)  -     FLUoxetine (PROZAC) 20 MG capsule; Take 1 capsule (20 mg) by mouth daily    SONG (generalized anxiety disorder)  -     FLUoxetine (PROZAC) 20 MG capsule; Take 1 capsule (20 mg) by mouth daily        Patient has been advised of split billing requirements and indicates understanding: Yes  COUNSELING:   Reviewed preventive health counseling, as reflected in patient instructions       Regular exercise       Healthy diet/nutrition       Immunizations    Vaccinated for: Influenza and TDAP          Estimated body mass index is 29.55 kg/m  as calculated  "from the following:    Height as of this encounter: 1.854 m (6' 1\").    Weight as of this encounter: 101.6 kg (224 lb).     Weight management plan: Discussed healthy diet and exercise guidelines    He reports that he has never smoked. He has never used smokeless tobacco.      Counseling Resources:  ATP IV Guidelines  Pooled Cohorts Equation Calculator  FRAX Risk Assessment  ICSI Preventive Guidelines  Dietary Guidelines for Americans, 2010  USDA's MyPlate  ASA Prophylaxis  Lung CA Screening    Chencho Morales MD  [facility]  "

## 2020-09-17 NOTE — NURSING NOTE
Prior to immunization administration, verified patients identity using patient s name and date of birth. Please see Immunization Activity for additional information.     Screening Questionnaire for Adult Immunization    Are you sick today?   No   Do you have allergies to medications, food, a vaccine component or latex?   No   Have you ever had a serious reaction after receiving a vaccination?   No   Do you have a long-term health problem with heart, lung, kidney, or metabolic disease (e.g., diabetes), asthma, a blood disorder, no spleen, complement component deficiency, a cochlear implant, or a spinal fluid leak?  Are you on long-term aspirin therapy?   No   Do you have cancer, leukemia, HIV/AIDS, or any other immune system problem?   No   Do you have a parent, brother, or sister with an immune system problem?   No   In the past 3 months, have you taken medications that affect  your immune system, such as prednisone, other steroids, or anticancer drugs; drugs for the treatment of rheumatoid arthritis, Crohn s disease, or psoriasis; or have you had radiation treatments?   No   Have you had a seizure, or a brain or other nervous system problem?   No   During the past year, have you received a transfusion of blood or blood    products, or been given immune (gamma) globulin or antiviral drug?   No   For women: Are you pregnant or is there a chance you could become       pregnant during the next month?   No   Have you received any vaccinations in the past 4 weeks?   No     Immunization questionnaire answers were all negative.        Per orders of Dr. Morales, injection of Adacel and Fluzone given by Hawa Montgomery MA. Patient instructed to remain in clinic for 15 minutes afterwards, and to report any adverse reaction to me immediately.       Screening performed by Hawa Montgomery MA on 9/17/2020 at 4:47 PM.  Hawa Montgomery MA on 9/17/2020 at 4:47 PM

## 2020-09-18 LAB
ALBUMIN SERPL-MCNC: 4.4 G/DL (ref 3.4–5)
ALP SERPL-CCNC: 83 U/L (ref 40–150)
ALT SERPL W P-5'-P-CCNC: 62 U/L (ref 0–70)
AMYLASE SERPL-CCNC: 23 U/L (ref 30–110)
ANION GAP SERPL CALCULATED.3IONS-SCNC: 6 MMOL/L (ref 3–14)
AST SERPL W P-5'-P-CCNC: 30 U/L (ref 0–45)
BILIRUB SERPL-MCNC: 2.1 MG/DL (ref 0.2–1.3)
BUN SERPL-MCNC: 14 MG/DL (ref 7–30)
CALCIUM SERPL-MCNC: 9.5 MG/DL (ref 8.5–10.1)
CHLORIDE SERPL-SCNC: 104 MMOL/L (ref 94–109)
CHOLEST SERPL-MCNC: 193 MG/DL
CO2 SERPL-SCNC: 27 MMOL/L (ref 20–32)
CREAT SERPL-MCNC: 0.94 MG/DL (ref 0.66–1.25)
GFR SERPL CREATININE-BSD FRML MDRD: >90 ML/MIN/{1.73_M2}
GLUCOSE SERPL-MCNC: 88 MG/DL (ref 70–99)
HDLC SERPL-MCNC: 40 MG/DL
LDLC SERPL CALC-MCNC: 123 MG/DL
LIPASE SERPL-CCNC: 36 U/L (ref 73–393)
NONHDLC SERPL-MCNC: 153 MG/DL
POTASSIUM SERPL-SCNC: 3.8 MMOL/L (ref 3.4–5.3)
PROT SERPL-MCNC: 7.5 G/DL (ref 6.8–8.8)
SODIUM SERPL-SCNC: 137 MMOL/L (ref 133–144)
TRIGL SERPL-MCNC: 152 MG/DL

## 2020-09-21 ENCOUNTER — MYC MEDICAL ADVICE (OUTPATIENT)
Dept: FAMILY MEDICINE | Facility: CLINIC | Age: 40
End: 2020-09-21

## 2020-12-01 ENCOUNTER — MYC MEDICAL ADVICE (OUTPATIENT)
Dept: FAMILY MEDICINE | Facility: CLINIC | Age: 40
End: 2020-12-01

## 2020-12-01 DIAGNOSIS — K86.1 CHRONIC RECURRENT PANCREATITIS (H): Primary | ICD-10-CM

## 2021-08-24 ENCOUNTER — MYC MEDICAL ADVICE (OUTPATIENT)
Dept: FAMILY MEDICINE | Facility: CLINIC | Age: 41
End: 2021-08-24

## 2021-08-24 DIAGNOSIS — F41.1 GAD (GENERALIZED ANXIETY DISORDER): ICD-10-CM

## 2021-08-24 DIAGNOSIS — F33.1 MODERATE EPISODE OF RECURRENT MAJOR DEPRESSIVE DISORDER (H): ICD-10-CM

## 2021-09-14 ENCOUNTER — MYC MEDICAL ADVICE (OUTPATIENT)
Dept: FAMILY MEDICINE | Facility: CLINIC | Age: 41
End: 2021-09-14

## 2021-09-14 NOTE — TELEPHONE ENCOUNTER
Called pt and scheduled for telephone visit for 9-16-21 for med check/refills prior to trip.     Chelsea Palomino RN

## 2021-09-16 ENCOUNTER — VIRTUAL VISIT (OUTPATIENT)
Dept: FAMILY MEDICINE | Facility: CLINIC | Age: 41
End: 2021-09-16
Payer: COMMERCIAL

## 2021-09-16 DIAGNOSIS — K86.1 CHRONIC RECURRENT PANCREATITIS (H): ICD-10-CM

## 2021-09-16 DIAGNOSIS — F33.1 MODERATE EPISODE OF RECURRENT MAJOR DEPRESSIVE DISORDER (H): ICD-10-CM

## 2021-09-16 DIAGNOSIS — F41.1 GAD (GENERALIZED ANXIETY DISORDER): ICD-10-CM

## 2021-09-16 PROCEDURE — 99214 OFFICE O/P EST MOD 30 MIN: CPT | Mod: 95 | Performed by: PHYSICIAN ASSISTANT

## 2021-09-16 ASSESSMENT — ANXIETY QUESTIONNAIRES
2. NOT BEING ABLE TO STOP OR CONTROL WORRYING: SEVERAL DAYS
GAD7 TOTAL SCORE: 8
3. WORRYING TOO MUCH ABOUT DIFFERENT THINGS: SEVERAL DAYS
5. BEING SO RESTLESS THAT IT IS HARD TO SIT STILL: SEVERAL DAYS
1. FEELING NERVOUS, ANXIOUS, OR ON EDGE: SEVERAL DAYS
7. FEELING AFRAID AS IF SOMETHING AWFUL MIGHT HAPPEN: SEVERAL DAYS
6. BECOMING EASILY ANNOYED OR IRRITABLE: MORE THAN HALF THE DAYS
IF YOU CHECKED OFF ANY PROBLEMS ON THIS QUESTIONNAIRE, HOW DIFFICULT HAVE THESE PROBLEMS MADE IT FOR YOU TO DO YOUR WORK, TAKE CARE OF THINGS AT HOME, OR GET ALONG WITH OTHER PEOPLE: SOMEWHAT DIFFICULT

## 2021-09-16 ASSESSMENT — PATIENT HEALTH QUESTIONNAIRE - PHQ9
5. POOR APPETITE OR OVEREATING: SEVERAL DAYS
SUM OF ALL RESPONSES TO PHQ QUESTIONS 1-9: 11

## 2021-09-16 NOTE — PROGRESS NOTES
"Kathleen is a 41 year old who is being evaluated via a billable telephone visit.      What phone number would you like to be contacted at? [phone]  How would you like to obtain your AVS? Pat    Assessment & Plan   Problem List Items Addressed This Visit        Digestive    Chronic recurrent pancreatitis (H) s/po resection of head of pancreas w cyst & Whipple in 2015    Relevant Medications    metFORMIN (GLUCOPHAGE) 500 MG tablet      Other Visit Diagnoses     Moderate episode of recurrent major depressive disorder (H)        Relevant Medications    FLUoxetine (PROZAC) 20 MG capsule    SONG (generalized anxiety disorder)        Relevant Medications    FLUoxetine (PROZAC) 20 MG capsule         Ok to refill medications.   Labs needed - lipase, amylase, CMP, A1C               BMI:   Estimated body mass index is 29.55 kg/m  as calculated from the following:    Height as of 9/17/20: 1.854 m (6' 1\").    Weight as of 9/17/20: 101.6 kg (224 lb).   Weight management plan: Discussed healthy diet and exercise guidelines    Depression Screening Follow Up    PHQ 9/16/2021   PHQ-9 Total Score 11   Q9: Thoughts of better off dead/self-harm past 2 weeks Not at all   F/U: Thoughts of suicide or self-harm -   F/U: Safety concerns -     Last PHQ-9 9/16/2021   1.  Little interest or pleasure in doing things 1   2.  Feeling down, depressed, or hopeless 1   3.  Trouble falling or staying asleep, or sleeping too much 1   4.  Feeling tired or having little energy 3   5.  Poor appetite or overeating 2   6.  Feeling bad about yourself 1   7.  Trouble concentrating 2   8.  Moving slowly or restless 0   Q9: Thoughts of better off dead/self-harm past 2 weeks 0   PHQ-9 Total Score 11   Difficulty at work, home, or with people Somewhat difficult   In the past two weeks have you had thoughts of suicide or self harm? -   Do you have concerns about your personal safety or the safety of others? -       Return in about 6 weeks (around " 10/28/2021) for Preventive Care Visit, Labs.    ZOHREH Faustin [facility] GLORIA Wang is a 41 year old who presents for the following health issues     HPI     Depression Followup    How are you doing with your depression since your last visit? No change    Are you having other symptoms that might be associated with depression? No    Have you had a significant life event?  No     Are you feeling anxious or having panic attacks?   No    Do you have any concerns with your use of alcohol or other drugs? No    Social History     Tobacco Use     Smoking status: Never Smoker     Smokeless tobacco: Never Used   Substance Use Topics     Alcohol use: Yes     Drug use: No     PHQ 12/2/2019 5/4/2020 9/16/2021   PHQ-9 Total Score 5 3 11   Q9: Thoughts of better off dead/self-harm past 2 weeks Not at all Not at all Not at all   F/U: Thoughts of suicide or self-harm - - -   F/U: Safety concerns - - -     SONG-7 SCORE 8/26/2019 5/4/2020 9/16/2021   Total Score 7 (mild anxiety) 2 (minimal anxiety) -   Total Score 7 2 8     Last PHQ-9 9/16/2021   1.  Little interest or pleasure in doing things 1   2.  Feeling down, depressed, or hopeless 1   3.  Trouble falling or staying asleep, or sleeping too much 1   4.  Feeling tired or having little energy 3   5.  Poor appetite or overeating 2   6.  Feeling bad about yourself 1   7.  Trouble concentrating 2   8.  Moving slowly or restless 0   Q9: Thoughts of better off dead/self-harm past 2 weeks 0   PHQ-9 Total Score 11   Difficulty at work, home, or with people Somewhat difficult   In the past two weeks have you had thoughts of suicide or self harm? -   Do you have concerns about your personal safety or the safety of others? -     SONG-7  9/16/2021   1. Feeling nervous, anxious, or on edge 1   2. Not being able to stop or control worrying 1   3. Worrying too much about different things 1   4. Trouble relaxing 1   5. Being so restless that  it is hard to sit still 1   6. Becoming easily annoyed or irritable 2   7. Feeling afraid, as if something awful might happen 1   SONG-7 Total Score 8   If you checked any problems, how difficult have they made it for you to do your work, take care of things at home, or get along with other people? Somewhat difficult       Suicide Assessment Five-step Evaluation and Treatment (SAFE-T)      How many servings of fruits and vegetables do you eat daily?  0-1    On average, how many sweetened beverages do you drink each day (Examples: soda, juice, sweet tea, etc.  Do NOT count diet or artificially sweetened beverages)?   0    How many days per week do you exercise enough to make your heart beat faster? 3 or less    How many minutes a day do you exercise enough to make your heart beat faster? 9 or less    How many days per week do you miss taking your medication? 0        Review of Systems         Objective           Vitals:  No vitals were obtained today due to virtual visit.    Physical Exam   healthy, alert and no distress  PSYCH: Alert and oriented times 3; coherent speech, normal   rate and volume, able to articulate logical thoughts, able   to abstract reason, no tangential thoughts, no hallucinations   or delusions  His affect is normal  RESP: No cough, no audible wheezing, able to talk in full sentences  Remainder of exam unable to be completed due to telephone visits                Phone call duration: 9 minutes

## 2021-09-17 ASSESSMENT — ANXIETY QUESTIONNAIRES: GAD7 TOTAL SCORE: 8

## 2021-10-10 ENCOUNTER — HEALTH MAINTENANCE LETTER (OUTPATIENT)
Age: 41
End: 2021-10-10

## 2021-12-04 ENCOUNTER — HEALTH MAINTENANCE LETTER (OUTPATIENT)
Age: 41
End: 2021-12-04

## 2022-09-18 ENCOUNTER — HEALTH MAINTENANCE LETTER (OUTPATIENT)
Age: 42
End: 2022-09-18

## 2023-01-28 ENCOUNTER — HEALTH MAINTENANCE LETTER (OUTPATIENT)
Age: 43
End: 2023-01-28

## 2023-05-26 NOTE — LETTER
May 4, 2020    Kathleen Calrk  [address] [place] [address]    Dear Etelvina Wang cares about your health and your health plan.  I have reviewed your medical conditions, medication list and lab results, and am making recommendations based on this review to better manage your health.    You are in particular need of attention regarding:  -Depression/Anxiety    I am recommending that you:     Please complete the attached PHQ9.         Please call us at the [email or web address] location:  [phone] or use [employer] to address the above recommendations.     Thank you for trusting [facility].  We appreciate the opportunity to serve you and look forward to supporting your healthcare in the future.    If you have (or plan to have) any of these tests done at a facility other than a [facility] or a [facility], please have the results sent to the [facility] location noted above.      Best Regards,    ALICE Monroe     Dapsone Pregnancy And Lactation Text: This medication is Pregnancy Category C and is not considered safe during pregnancy or breast feeding.

## 2023-10-21 ENCOUNTER — HOSPITAL ENCOUNTER (EMERGENCY)
Facility: CLINIC | Age: 43
Discharge: HOME | End: 2023-10-21
Attending: STUDENT IN AN ORGANIZED HEALTH CARE EDUCATION/TRAINING PROGRAM | Admitting: STUDENT IN AN ORGANIZED HEALTH CARE EDUCATION/TRAINING PROGRAM
Payer: COMMERCIAL

## 2023-10-21 ENCOUNTER — APPOINTMENT (OUTPATIENT)
Dept: CT IMAGING | Facility: CLINIC | Age: 43
End: 2023-10-21
Attending: STUDENT IN AN ORGANIZED HEALTH CARE EDUCATION/TRAINING PROGRAM
Payer: COMMERCIAL

## 2023-10-21 VITALS
HEART RATE: 54 BPM | BODY MASS INDEX: 27.83 KG/M2 | DIASTOLIC BLOOD PRESSURE: 96 MMHG | HEIGHT: 73 IN | OXYGEN SATURATION: 98 % | RESPIRATION RATE: 16 BRPM | TEMPERATURE: 98.1 F | WEIGHT: 210 LBS | SYSTOLIC BLOOD PRESSURE: 148 MMHG

## 2023-10-21 DIAGNOSIS — R10.13 ABDOMINAL PAIN, EPIGASTRIC: ICD-10-CM

## 2023-10-21 LAB
ALBUMIN SERPL BCG-MCNC: 4.8 G/DL (ref 3.5–5.2)
ALP SERPL-CCNC: 77 U/L (ref 40–129)
ALT SERPL W P-5'-P-CCNC: 56 U/L (ref 0–70)
AMYLASE SERPL-CCNC: 30 U/L (ref 28–100)
ANION GAP SERPL CALCULATED.3IONS-SCNC: 6 MMOL/L (ref 7–15)
AST SERPL W P-5'-P-CCNC: 34 U/L (ref 0–45)
ATRIAL RATE - MUSE: 58 BPM
BASO+EOS+MONOS # BLD AUTO: NORMAL 10*3/UL
BASO+EOS+MONOS NFR BLD AUTO: NORMAL %
BASOPHILS # BLD AUTO: 0 10E3/UL (ref 0–0.2)
BASOPHILS NFR BLD AUTO: 1 %
BILIRUB SERPL-MCNC: 1.2 MG/DL
BUN SERPL-MCNC: 13.6 MG/DL (ref 6–20)
CALCIUM SERPL-MCNC: 9.5 MG/DL (ref 8.6–10)
CHLORIDE SERPL-SCNC: 101 MMOL/L (ref 98–107)
CREAT SERPL-MCNC: 0.94 MG/DL (ref 0.67–1.17)
DEPRECATED HCO3 PLAS-SCNC: 30 MMOL/L (ref 22–29)
DIASTOLIC BLOOD PRESSURE - MUSE: NORMAL MMHG
EGFRCR SERPLBLD CKD-EPI 2021: >90 ML/MIN/1.73M2
EOSINOPHIL # BLD AUTO: 0.2 10E3/UL (ref 0–0.7)
EOSINOPHIL NFR BLD AUTO: 4 %
ERYTHROCYTE [DISTWIDTH] IN BLOOD BY AUTOMATED COUNT: 11.9 % (ref 10–15)
GLUCOSE SERPL-MCNC: 125 MG/DL (ref 70–99)
HCT VFR BLD AUTO: 45.4 % (ref 40–53)
HGB BLD-MCNC: 15.3 G/DL (ref 13.3–17.7)
HOLD SPECIMEN: NORMAL
IMM GRANULOCYTES # BLD: 0 10E3/UL
IMM GRANULOCYTES NFR BLD: 0 %
INTERPRETATION ECG - MUSE: NORMAL
LIPASE SERPL-CCNC: 12 U/L (ref 13–60)
LYMPHOCYTES # BLD AUTO: 2 10E3/UL (ref 0.8–5.3)
LYMPHOCYTES NFR BLD AUTO: 43 %
MCH RBC QN AUTO: 30.7 PG (ref 26.5–33)
MCHC RBC AUTO-ENTMCNC: 33.7 G/DL (ref 31.5–36.5)
MCV RBC AUTO: 91 FL (ref 78–100)
MONOCYTES # BLD AUTO: 0.4 10E3/UL (ref 0–1.3)
MONOCYTES NFR BLD AUTO: 8 %
NEUTROPHILS # BLD AUTO: 2.1 10E3/UL (ref 1.6–8.3)
NEUTROPHILS NFR BLD AUTO: 44 %
NRBC # BLD AUTO: 0 10E3/UL
NRBC BLD AUTO-RTO: 0 /100
P AXIS - MUSE: 33 DEGREES
PLATELET # BLD AUTO: 189 10E3/UL (ref 150–450)
POTASSIUM SERPL-SCNC: 4.2 MMOL/L (ref 3.4–5.3)
PR INTERVAL - MUSE: 188 MS
PROT SERPL-MCNC: 7.3 G/DL (ref 6.4–8.3)
QRS DURATION - MUSE: 90 MS
QT - MUSE: 414 MS
QTC - MUSE: 406 MS
R AXIS - MUSE: 58 DEGREES
RBC # BLD AUTO: 4.98 10E6/UL (ref 4.4–5.9)
SODIUM SERPL-SCNC: 137 MMOL/L (ref 135–145)
SYSTOLIC BLOOD PRESSURE - MUSE: NORMAL MMHG
T AXIS - MUSE: 45 DEGREES
VENTRICULAR RATE- MUSE: 58 BPM
WBC # BLD AUTO: 4.7 10E3/UL (ref 4–11)

## 2023-10-21 PROCEDURE — 80053 COMPREHEN METABOLIC PANEL: CPT | Performed by: STUDENT IN AN ORGANIZED HEALTH CARE EDUCATION/TRAINING PROGRAM

## 2023-10-21 PROCEDURE — 250N000011 HC RX IP 250 OP 636: Performed by: STUDENT IN AN ORGANIZED HEALTH CARE EDUCATION/TRAINING PROGRAM

## 2023-10-21 PROCEDURE — 250N000011 HC RX IP 250 OP 636: Mod: JZ | Performed by: STUDENT IN AN ORGANIZED HEALTH CARE EDUCATION/TRAINING PROGRAM

## 2023-10-21 PROCEDURE — 96375 TX/PRO/DX INJ NEW DRUG ADDON: CPT

## 2023-10-21 PROCEDURE — 250N000009 HC RX 250: Performed by: STUDENT IN AN ORGANIZED HEALTH CARE EDUCATION/TRAINING PROGRAM

## 2023-10-21 PROCEDURE — 93005 ELECTROCARDIOGRAM TRACING: CPT

## 2023-10-21 PROCEDURE — 85025 COMPLETE CBC W/AUTO DIFF WBC: CPT | Performed by: STUDENT IN AN ORGANIZED HEALTH CARE EDUCATION/TRAINING PROGRAM

## 2023-10-21 PROCEDURE — C9113 INJ PANTOPRAZOLE SODIUM, VIA: HCPCS | Mod: JZ | Performed by: STUDENT IN AN ORGANIZED HEALTH CARE EDUCATION/TRAINING PROGRAM

## 2023-10-21 PROCEDURE — 258N000003 HC RX IP 258 OP 636: Performed by: STUDENT IN AN ORGANIZED HEALTH CARE EDUCATION/TRAINING PROGRAM

## 2023-10-21 PROCEDURE — 99285 EMERGENCY DEPT VISIT HI MDM: CPT | Mod: 25

## 2023-10-21 PROCEDURE — 82150 ASSAY OF AMYLASE: CPT | Performed by: STUDENT IN AN ORGANIZED HEALTH CARE EDUCATION/TRAINING PROGRAM

## 2023-10-21 PROCEDURE — 96374 THER/PROPH/DIAG INJ IV PUSH: CPT | Mod: 59

## 2023-10-21 PROCEDURE — 83690 ASSAY OF LIPASE: CPT | Performed by: STUDENT IN AN ORGANIZED HEALTH CARE EDUCATION/TRAINING PROGRAM

## 2023-10-21 PROCEDURE — 96361 HYDRATE IV INFUSION ADD-ON: CPT

## 2023-10-21 PROCEDURE — 36415 COLL VENOUS BLD VENIPUNCTURE: CPT | Performed by: STUDENT IN AN ORGANIZED HEALTH CARE EDUCATION/TRAINING PROGRAM

## 2023-10-21 PROCEDURE — 74177 CT ABD & PELVIS W/CONTRAST: CPT

## 2023-10-21 RX ORDER — ONDANSETRON 2 MG/ML
4 INJECTION INTRAMUSCULAR; INTRAVENOUS EVERY 30 MIN PRN
Status: DISCONTINUED | OUTPATIENT
Start: 2023-10-21 | End: 2023-10-21 | Stop reason: HOSPADM

## 2023-10-21 RX ORDER — IOPAMIDOL 755 MG/ML
105 INJECTION, SOLUTION INTRAVASCULAR ONCE
Status: COMPLETED | OUTPATIENT
Start: 2023-10-21 | End: 2023-10-21

## 2023-10-21 RX ORDER — HYDROMORPHONE HYDROCHLORIDE 1 MG/ML
0.5 INJECTION, SOLUTION INTRAMUSCULAR; INTRAVENOUS; SUBCUTANEOUS EVERY 30 MIN PRN
Status: DISCONTINUED | OUTPATIENT
Start: 2023-10-21 | End: 2023-10-21 | Stop reason: HOSPADM

## 2023-10-21 RX ADMIN — HYDROMORPHONE HYDROCHLORIDE 0.5 MG: 1 INJECTION, SOLUTION INTRAMUSCULAR; INTRAVENOUS; SUBCUTANEOUS at 08:07

## 2023-10-21 RX ADMIN — PANTOPRAZOLE SODIUM 40 MG: 40 INJECTION, POWDER, FOR SOLUTION INTRAVENOUS at 08:08

## 2023-10-21 RX ADMIN — IOPAMIDOL 105 ML: 755 INJECTION, SOLUTION INTRAVENOUS at 08:43

## 2023-10-21 RX ADMIN — ONDANSETRON 4 MG: 2 INJECTION INTRAMUSCULAR; INTRAVENOUS at 08:08

## 2023-10-21 RX ADMIN — SODIUM CHLORIDE 70 ML: 9 INJECTION, SOLUTION INTRAVENOUS at 08:43

## 2023-10-21 RX ADMIN — SODIUM CHLORIDE 1000 ML: 9 INJECTION, SOLUTION INTRAVENOUS at 08:03

## 2023-10-21 ASSESSMENT — ACTIVITIES OF DAILY LIVING (ADL)
ADLS_ACUITY_SCORE: 35
ADLS_ACUITY_SCORE: 35

## 2023-10-21 NOTE — ED PROVIDER NOTES
"  History     Chief Complaint:  Abdominal Pain       HPI   Kathleen Clark is a 43 year old male with past medical history IPMN s/p Whipple in 2015, gastritis who presents to the ED for evaluation of burning abdominal pain that has been more severe over the past week.  He has been trying Carafate, added back in 40 mg omeprazole daily, limiting caffeine, alcohol, fatty foods.  He also notes increased abdominal cramping as well as malodorous flatulence and pale appearing stools.  This is worse when he eats fatty foods.  He did take Creon for a couple of months after Whipple although was able to cough this successfully without any dietary restrictions.  Has been taking Tylenol without much improvement in his symptoms.  Finally, he notes that for over some time he was on Prozac      Independent Historian:   None - Patient Only    Review of External Notes:   Care everywhere 2016.      Medications:    FLUoxetine (PROZAC) 20 MG capsule  metFORMIN (GLUCOPHAGE) 500 MG tablet        Past Medical History:    No past medical history on file.    Past Surgical History:    Past Surgical History:   Procedure Laterality Date    ABDOMEN SURGERY  2015    Whipple surgery    HERNIA REPAIR  2015    ventral hernia after the Whipple surgery        Physical Exam   Patient Vitals for the past 24 hrs:   BP Temp Temp src Pulse Resp SpO2 Height Weight   10/21/23 1040 (!) (P) 124/90 -- -- 54 16 98 % -- --   10/21/23 0900 -- -- -- 55 14 97 % -- --   10/21/23 0810 -- -- -- 59 15 97 % -- --   10/21/23 0805 -- -- -- 59 14 97 % -- --   10/21/23 0652 (!) 148/96 98.1  F (36.7  C) Temporal 69 18 98 % 1.854 m (6' 1\") 95.3 kg (210 lb)        Physical Exam  General: Awake, alert, in no acute distress   HEENT: Atraumatic   EOM normal   External ears normal   Trachea midline  Neck: Supple, normal ROM  CV: Regular rate, regular rhythm   No murmur   No lower extremity edema  2+ radial and DP pulses  PULM: Breath sounds normal bilaterally  No wheezes or " rales  ABD: Soft, non-tender, non-distended  Normal bowel sounds   No rebound or guarding   MSK: No gross deformities  NEURO: Alert, no focal deficits  Skin: Warm, dry and intact      Emergency Department Course   ECG  ECG results from 10/21/23   EKG 12-lead, tracing only     Value    Systolic Blood Pressure     Diastolic Blood Pressure     Ventricular Rate 58    Atrial Rate 58    UT Interval 188    QRS Duration 90        QTc 406    P Axis 33    R AXIS 58    T Axis 45    Interpretation ECG      Sinus bradycardia  Otherwise normal ECG  No previous ECGs available         Imaging:  CT Abdomen Pelvis w Contrast   Final Result   IMPRESSION:    1.  No acute findings in the abdomen and pelvis.   2.  Postop change Whipple procedure and cholecystectomy.             Laboratory:  Labs Ordered and Resulted from Time of ED Arrival to Time of ED Departure   COMPREHENSIVE METABOLIC PANEL - Abnormal       Result Value    Sodium 137      Potassium 4.2      Carbon Dioxide (CO2) 30 (*)     Anion Gap 6 (*)     Urea Nitrogen 13.6      Creatinine 0.94      GFR Estimate >90      Calcium 9.5      Chloride 101      Glucose 125 (*)     Alkaline Phosphatase 77      AST 34      ALT 56      Protein Total 7.3      Albumin 4.8      Bilirubin Total 1.2     LIPASE - Abnormal    Lipase 12 (*)    AMYLASE - Normal    Amylase 30     CBC WITH PLATELETS AND DIFFERENTIAL    WBC Count 4.7      RBC Count 4.98      Hemoglobin 15.3      Hematocrit 45.4      MCV 91      MCH 30.7      MCHC 33.7      RDW 11.9      Platelet Count 189      % Neutrophils 44      % Lymphocytes 43      % Monocytes 8      Mids % (Monos, Eos, Basos)        % Eosinophils 4      % Basophils 1      % Immature Granulocytes 0      NRBCs per 100 WBC 0      Absolute Neutrophils 2.1      Absolute Lymphocytes 2.0      Absolute Monocytes 0.4      Mids Abs (Monos, Eos, Basos)        Absolute Eosinophils 0.2      Absolute Basophils 0.0      Absolute Immature Granulocytes 0.0      Absolute  NRBCs 0.0          Procedures   None    Emergency Department Course & Assessments:             Interventions:  Medications   sodium chloride 0.9% BOLUS 1,000 mL (0 mLs Intravenous Stopped 10/21/23 1036)   pantoprazole (PROTONIX) IV push injection 40 mg (40 mg Intravenous $Given 10/21/23 0808)   iopamidol (ISOVUE-370) solution 105 mL (105 mLs Intravenous $Given 10/21/23 0843)   sodium chloride 0.9 % bag 500mL for CT scan flush use (70 mLs Intravenous $Given 10/21/23 0843)        Assessments:  See ED course     Independent Interpretation (X-rays, CTs, rhythm strip):  None    Consultations/Discussion of Management or Tests:  None   ED Course as of 10/21/23 1513   Sat Oct 21, 2023   1020 Reevaluation.  Discussed findings and results.       Social Determinants of Health affecting care:   None    Disposition:  The patient was discharged to home.     Impression & Plan    CMS Diagnoses: None      Medical Decision Making:  Vital stopping on arrival  This is a patient who had a Whipple for obstructive IPMN in 2015  Sounds like he has IBS or some degree of gastritis, describing flares around busy times of the year  He is here with what sounds like gastritis although could also be peptic ulcer disease, obstruction, recurrence of disease  Because of his complex history, imaging obtained which fortunately does not show evidence of obstruction, new lesions  His labs are reassuring with normal LFTs, lipase, amylase  I suspect he has a degree of malabsorption given his report of malodorous pale stools although he also describes pain about an hour after eating which could be related to PUD in the duodenum  Recommending restarting his omeprazole which she just did this week.  He has Carafate at home.  Can also take Mylanta as needed  Eat low fat diet  Recommending a low-fat diet, close GI follow-up -I think he would benefit from EGD and H. pylori testing  If his symptoms are not improving or he still struggling, I suggested that he  also check with the GI about further evaluation of his pancreatic enzyme levels  Patient feels comfortable with this plan.  Return precautions provided        Diagnosis:    ICD-10-CM    1. Abdominal pain, epigastric  R10.13              Eneida Pacheco,   10/21/2023   Eneida Nash, Eneida Holley,   10/21/23 1514

## 2023-10-21 NOTE — ED TRIAGE NOTES
Patient presents with upper middle abdominal pain that started a few days ago, worsening overnight tonight. Patient has a history of an abdominal surgery and pancreatitis.      Triage Assessment (Adult)       Row Name 10/21/23 0650          Triage Assessment    Airway WDL WDL        Respiratory WDL    Respiratory WDL WDL        Skin Circulation/Temperature WDL    Skin Circulation/Temperature WDL WDL        Cardiac WDL    Cardiac WDL WDL        Peripheral/Neurovascular WDL    Peripheral Neurovascular WDL WDL        Cognitive/Neuro/Behavioral WDL    Cognitive/Neuro/Behavioral WDL WDL

## 2024-01-25 NOTE — TELEPHONE ENCOUNTER
Panel Management Review      Patient has the following on his problem list:     Depression / Dysthymia review    Measure:  Needs PHQ-9 score of 4 or less during index window.  Administer PHQ-9 and if score is 5 or more, send encounter to provider for next steps.    5 - 7 month window range:     PHQ-9 SCORE 5/28/2019 6/28/2019 8/26/2019   PHQ-9 Total Score MyChart - 13 (Moderate depression) 4 (Minimal depression)   PHQ-9 Total Score 0 13 4       If PHQ-9 recheck is 5 or more, route to provider for next steps.    Patient is due for:  PHQ9      Composite cancer screening  Chart review shows that this patient is due/due soon for the following None  Summary:    Patient is due/failing the following:   PHQ9    Action needed:   Patient needs to do PHQ9.    Type of outreach:    Sent [employer] message.    Questions for provider review:    None                                                                                                                                    Elva Duran CMA                Photo Preface (Leave Blank If You Do Not Want): Photographs were obtained today Detail Level: Zone

## 2024-02-25 ENCOUNTER — HEALTH MAINTENANCE LETTER (OUTPATIENT)
Age: 44
End: 2024-02-25

## 2025-03-09 ENCOUNTER — HEALTH MAINTENANCE LETTER (OUTPATIENT)
Age: 45
End: 2025-03-09

## (undated) NOTE — LETTER
July 21, 2017         Jadiel Werner MD  [address] [facility] [address]      Patient: Yessenia Driscoll   YOB: 1980   Date of Visit: 7/17/2017       Dear  Dr. Jadiel Werner MD,      Thank you for referring Yessenia Driscoll to my

## (undated) NOTE — LETTER
No referring provider defined for this encounter. 07/17/17        Patient: Bro Byrne   YOB: 1980   Date of Visit: 7/17/2017       Dear  Dr. Fede Ovalle MD,      Thank you for referring Bro Byrne to my practice.    I